# Patient Record
Sex: MALE | Race: WHITE | NOT HISPANIC OR LATINO
[De-identification: names, ages, dates, MRNs, and addresses within clinical notes are randomized per-mention and may not be internally consistent; named-entity substitution may affect disease eponyms.]

---

## 2017-01-19 ENCOUNTER — APPOINTMENT (OUTPATIENT)
Dept: ORTHOPEDIC SURGERY | Facility: CLINIC | Age: 52
End: 2017-01-19

## 2017-01-19 VITALS
DIASTOLIC BLOOD PRESSURE: 102 MMHG | HEART RATE: 73 BPM | BODY MASS INDEX: 37.51 KG/M2 | HEIGHT: 67 IN | WEIGHT: 239 LBS | SYSTOLIC BLOOD PRESSURE: 157 MMHG

## 2017-01-19 DIAGNOSIS — M25.851 OTHER SPECIFIED JOINT DISORDERS, RIGHT HIP: ICD-10-CM

## 2017-01-19 DIAGNOSIS — M19.90 UNSPECIFIED OSTEOARTHRITIS, UNSPECIFIED SITE: ICD-10-CM

## 2017-02-02 ENCOUNTER — MEDICATION RENEWAL (OUTPATIENT)
Age: 52
End: 2017-02-02

## 2017-02-02 ENCOUNTER — APPOINTMENT (OUTPATIENT)
Dept: INTERNAL MEDICINE | Facility: CLINIC | Age: 52
End: 2017-02-02

## 2017-02-02 VITALS
RESPIRATION RATE: 14 BRPM | HEIGHT: 67 IN | SYSTOLIC BLOOD PRESSURE: 126 MMHG | BODY MASS INDEX: 37.67 KG/M2 | HEART RATE: 78 BPM | OXYGEN SATURATION: 96 % | WEIGHT: 240 LBS | DIASTOLIC BLOOD PRESSURE: 80 MMHG

## 2017-02-03 LAB — HBA1C MFR BLD HPLC: 5.6 %

## 2017-02-06 LAB
ALBUMIN SERPL ELPH-MCNC: 4.2 G/DL
ALP BLD-CCNC: 85 U/L
ALT SERPL-CCNC: 40 U/L
ANION GAP SERPL CALC-SCNC: 15 MMOL/L
AST SERPL-CCNC: 27 U/L
BILIRUB SERPL-MCNC: 0.6 MG/DL
BUN SERPL-MCNC: 19 MG/DL
CALCIUM SERPL-MCNC: 9 MG/DL
CHLORIDE SERPL-SCNC: 102 MMOL/L
CHOLEST SERPL-MCNC: 126 MG/DL
CHOLEST/HDLC SERPL: 2.9 RATIO
CO2 SERPL-SCNC: 26 MMOL/L
CREAT SERPL-MCNC: 1.05 MG/DL
GLUCOSE SERPL-MCNC: 105 MG/DL
HDLC SERPL-MCNC: 44 MG/DL
LDLC SERPL CALC-MCNC: 64 MG/DL
POTASSIUM SERPL-SCNC: 4.2 MMOL/L
PROT SERPL-MCNC: 7.5 G/DL
SODIUM SERPL-SCNC: 143 MMOL/L
TRIGL SERPL-MCNC: 90 MG/DL

## 2017-03-02 ENCOUNTER — APPOINTMENT (OUTPATIENT)
Dept: DERMATOLOGY | Facility: CLINIC | Age: 52
End: 2017-03-02

## 2017-03-02 ENCOUNTER — APPOINTMENT (OUTPATIENT)
Dept: INTERNAL MEDICINE | Facility: CLINIC | Age: 52
End: 2017-03-02

## 2017-03-02 VITALS
BODY MASS INDEX: 37.35 KG/M2 | RESPIRATION RATE: 14 BRPM | HEART RATE: 85 BPM | TEMPERATURE: 97.8 F | OXYGEN SATURATION: 98 % | SYSTOLIC BLOOD PRESSURE: 140 MMHG | WEIGHT: 238 LBS | DIASTOLIC BLOOD PRESSURE: 98 MMHG | HEIGHT: 67 IN

## 2017-03-02 VITALS — DIASTOLIC BLOOD PRESSURE: 80 MMHG | SYSTOLIC BLOOD PRESSURE: 130 MMHG

## 2017-03-02 DIAGNOSIS — M54.9 DORSALGIA, UNSPECIFIED: ICD-10-CM

## 2017-03-03 ENCOUNTER — APPOINTMENT (OUTPATIENT)
Dept: DERMATOLOGY | Facility: CLINIC | Age: 52
End: 2017-03-03

## 2017-03-08 ENCOUNTER — APPOINTMENT (OUTPATIENT)
Dept: ORTHOPEDIC SURGERY | Facility: CLINIC | Age: 52
End: 2017-03-08

## 2017-03-21 ENCOUNTER — OUTPATIENT (OUTPATIENT)
Dept: OUTPATIENT SERVICES | Facility: HOSPITAL | Age: 52
LOS: 1 days | End: 2017-03-21
Payer: COMMERCIAL

## 2017-03-21 VITALS
OXYGEN SATURATION: 98 % | SYSTOLIC BLOOD PRESSURE: 153 MMHG | RESPIRATION RATE: 18 BRPM | HEIGHT: 67 IN | TEMPERATURE: 98 F | DIASTOLIC BLOOD PRESSURE: 100 MMHG | WEIGHT: 138.89 LBS

## 2017-03-21 DIAGNOSIS — M19.90 UNSPECIFIED OSTEOARTHRITIS, UNSPECIFIED SITE: ICD-10-CM

## 2017-03-21 DIAGNOSIS — M16.12 UNILATERAL PRIMARY OSTEOARTHRITIS, LEFT HIP: ICD-10-CM

## 2017-03-21 DIAGNOSIS — Z01.818 ENCOUNTER FOR OTHER PREPROCEDURAL EXAMINATION: ICD-10-CM

## 2017-03-21 DIAGNOSIS — Z98.890 OTHER SPECIFIED POSTPROCEDURAL STATES: Chronic | ICD-10-CM

## 2017-03-21 LAB
ALBUMIN SERPL ELPH-MCNC: 3.9 G/DL — SIGNIFICANT CHANGE UP (ref 3.3–5)
ALP SERPL-CCNC: 89 U/L — SIGNIFICANT CHANGE UP (ref 30–120)
ALT FLD-CCNC: 54 U/L DA — SIGNIFICANT CHANGE UP (ref 10–60)
ANION GAP SERPL CALC-SCNC: 8 MMOL/L — SIGNIFICANT CHANGE UP (ref 5–17)
APTT BLD: 27.8 SEC — SIGNIFICANT CHANGE UP (ref 27.5–37.4)
AST SERPL-CCNC: 22 U/L — SIGNIFICANT CHANGE UP (ref 10–40)
BILIRUB SERPL-MCNC: 0.4 MG/DL — SIGNIFICANT CHANGE UP (ref 0.2–1.2)
BLD GP AB SCN SERPL QL: SIGNIFICANT CHANGE UP
BUN SERPL-MCNC: 16 MG/DL — SIGNIFICANT CHANGE UP (ref 7–23)
CALCIUM SERPL-MCNC: 9.6 MG/DL — SIGNIFICANT CHANGE UP (ref 8.4–10.5)
CHLORIDE SERPL-SCNC: 105 MMOL/L — SIGNIFICANT CHANGE UP (ref 96–108)
CO2 SERPL-SCNC: 28 MMOL/L — SIGNIFICANT CHANGE UP (ref 22–31)
CREAT SERPL-MCNC: 1.05 MG/DL — SIGNIFICANT CHANGE UP (ref 0.5–1.3)
GLUCOSE SERPL-MCNC: 132 MG/DL — HIGH (ref 70–99)
HCT VFR BLD CALC: 49.3 % — SIGNIFICANT CHANGE UP (ref 39–50)
HGB BLD-MCNC: 17.2 G/DL — HIGH (ref 13–17)
INR BLD: 0.96 RATIO — SIGNIFICANT CHANGE UP (ref 0.88–1.16)
MCHC RBC-ENTMCNC: 31.3 PG — SIGNIFICANT CHANGE UP (ref 27–34)
MCHC RBC-ENTMCNC: 35 GM/DL — SIGNIFICANT CHANGE UP (ref 32–36)
MCV RBC AUTO: 89.5 FL — SIGNIFICANT CHANGE UP (ref 80–100)
MRSA PCR RESULT.: SIGNIFICANT CHANGE UP
PLATELET # BLD AUTO: 250 K/UL — SIGNIFICANT CHANGE UP (ref 150–400)
POTASSIUM SERPL-MCNC: 4.1 MMOL/L — SIGNIFICANT CHANGE UP (ref 3.5–5.3)
POTASSIUM SERPL-SCNC: 4.1 MMOL/L — SIGNIFICANT CHANGE UP (ref 3.5–5.3)
PROT SERPL-MCNC: 8.1 G/DL — SIGNIFICANT CHANGE UP (ref 6–8.3)
PROTHROM AB SERPL-ACNC: 10.4 SEC — SIGNIFICANT CHANGE UP (ref 9.8–12.7)
RBC # BLD: 5.51 M/UL — SIGNIFICANT CHANGE UP (ref 4.2–5.8)
RBC # FLD: 12.8 % — SIGNIFICANT CHANGE UP (ref 10.3–14.5)
S AUREUS DNA NOSE QL NAA+PROBE: SIGNIFICANT CHANGE UP
SODIUM SERPL-SCNC: 141 MMOL/L — SIGNIFICANT CHANGE UP (ref 135–145)
WBC # BLD: 14.2 K/UL — HIGH (ref 3.8–10.5)
WBC # FLD AUTO: 14.2 K/UL — HIGH (ref 3.8–10.5)

## 2017-03-21 PROCEDURE — 93010 ELECTROCARDIOGRAM REPORT: CPT | Mod: NC

## 2017-03-21 NOTE — H&P PST ADULT - HISTORY OF PRESENT ILLNESS
52 y/o male from home in [facility] with left hip pain Failed pain management. Pain intermittent mostly with sitting, not on any pain meds at present.

## 2017-03-21 NOTE — H&P PST ADULT - RS GEN PE MLT RESP DETAILS PC
normal/no chest wall tenderness/respirations non-labored/breath sounds equal/airway patent/clear to auscultation bilaterally/good air movement

## 2017-03-28 ENCOUNTER — APPOINTMENT (OUTPATIENT)
Dept: INTERNAL MEDICINE | Facility: CLINIC | Age: 52
End: 2017-03-28

## 2017-03-28 VITALS
WEIGHT: 240 LBS | HEART RATE: 70 BPM | SYSTOLIC BLOOD PRESSURE: 124 MMHG | BODY MASS INDEX: 37.67 KG/M2 | RESPIRATION RATE: 14 BRPM | HEIGHT: 67 IN | OXYGEN SATURATION: 98 % | DIASTOLIC BLOOD PRESSURE: 80 MMHG | TEMPERATURE: 97.8 F

## 2017-03-29 ENCOUNTER — APPOINTMENT (OUTPATIENT)
Dept: ORTHOPEDIC SURGERY | Facility: CLINIC | Age: 52
End: 2017-03-29

## 2017-03-29 VITALS
BODY MASS INDEX: 37.51 KG/M2 | HEART RATE: 73 BPM | DIASTOLIC BLOOD PRESSURE: 85 MMHG | WEIGHT: 239 LBS | SYSTOLIC BLOOD PRESSURE: 129 MMHG | HEIGHT: 67 IN

## 2017-03-29 DIAGNOSIS — M54.16 RADICULOPATHY, LUMBAR REGION: ICD-10-CM

## 2017-03-29 DIAGNOSIS — M47.816 SPONDYLOSIS W/OUT MYELOPATHY OR RADICULOPATHY, LUMBAR REGION: ICD-10-CM

## 2017-03-29 DIAGNOSIS — M51.26 OTHER INTERVERTEBRAL DISC DISPLACEMENT, LUMBAR REGION: ICD-10-CM

## 2017-03-29 LAB
HCT VFR BLD CALC: 49.3 % — SIGNIFICANT CHANGE UP (ref 39–50)
HGB BLD-MCNC: 17.5 G/DL — HIGH (ref 13–17)
MCHC RBC-ENTMCNC: 31 PG — SIGNIFICANT CHANGE UP (ref 27–34)
MCHC RBC-ENTMCNC: 35.6 GM/DL — SIGNIFICANT CHANGE UP (ref 32–36)
MCV RBC AUTO: 87 FL — SIGNIFICANT CHANGE UP (ref 80–100)
NRBC # BLD: SIGNIFICANT CHANGE UP /100 WBCS (ref 0–0)
PLATELET # BLD AUTO: 248 K/UL — SIGNIFICANT CHANGE UP (ref 150–400)
RBC # BLD: 5.66 M/UL — SIGNIFICANT CHANGE UP (ref 4.2–5.8)
RBC # FLD: 12.3 % — SIGNIFICANT CHANGE UP (ref 10.3–14.5)
WBC # BLD: 8.9 K/UL — SIGNIFICANT CHANGE UP (ref 3.8–10.5)
WBC # FLD AUTO: 8.9 K/UL — SIGNIFICANT CHANGE UP (ref 3.8–10.5)

## 2017-03-29 NOTE — PROGRESS NOTE PEDS - SUBJECTIVE AND OBJECTIVE BOX
Cbc 14.2. patient was on prednisone the day pPST labs were done. Patient was called and repeated CBC. Will follow up

## 2017-03-30 ENCOUNTER — RESULT REVIEW (OUTPATIENT)
Age: 52
End: 2017-03-30

## 2017-03-30 RX ORDER — FAMOTIDINE 10 MG/ML
1 INJECTION INTRAVENOUS
Qty: 0 | Refills: 0 | COMMUNITY
Start: 2017-03-30 | End: 2017-03-31

## 2017-03-30 RX ORDER — ZINC SULFATE TAB 220 MG (50 MG ZINC EQUIVALENT) 220 (50 ZN) MG
1 TAB ORAL
Qty: 0 | Refills: 0 | DISCHARGE
Start: 2017-03-30

## 2017-03-30 RX ORDER — APREPITANT 80 MG/1
40 CAPSULE ORAL ONCE
Qty: 0 | Refills: 0 | Status: COMPLETED | OUTPATIENT
Start: 2017-03-31 | End: 2017-03-31

## 2017-03-30 RX ORDER — SIMVASTATIN 20 MG/1
1 TABLET, FILM COATED ORAL
Qty: 0 | Refills: 0 | DISCHARGE
Start: 2017-03-30 | End: 2017-03-31

## 2017-03-30 RX ORDER — CELECOXIB 200 MG/1
200 CAPSULE ORAL ONCE
Qty: 0 | Refills: 0 | Status: COMPLETED | OUTPATIENT
Start: 2017-03-31 | End: 2017-03-31

## 2017-03-30 RX ORDER — CHOLECALCIFEROL (VITAMIN D3) 125 MCG
1 CAPSULE ORAL
Qty: 0 | Refills: 0 | DISCHARGE
Start: 2017-03-30

## 2017-03-30 NOTE — PATIENT PROFILE ADULT. - PMH
Essential hypertension    History of pancreatitis  2012  History of stroke  2011, no residual left  JUAN PABLO on CPAP    Primary osteoarthritis of left hip    Seasonal allergies

## 2017-03-31 ENCOUNTER — APPOINTMENT (OUTPATIENT)
Dept: ORTHOPEDIC SURGERY | Facility: HOSPITAL | Age: 52
End: 2017-03-31

## 2017-03-31 ENCOUNTER — INPATIENT (INPATIENT)
Facility: HOSPITAL | Age: 52
LOS: 2 days | Discharge: HOME | DRG: 470 | End: 2017-04-03
Attending: ORTHOPAEDIC SURGERY | Admitting: ORTHOPAEDIC SURGERY
Payer: COMMERCIAL

## 2017-03-31 VITALS
OXYGEN SATURATION: 97 % | RESPIRATION RATE: 16 BRPM | HEART RATE: 66 BPM | WEIGHT: 235.89 LBS | TEMPERATURE: 98 F | DIASTOLIC BLOOD PRESSURE: 79 MMHG | SYSTOLIC BLOOD PRESSURE: 126 MMHG | HEIGHT: 68 IN

## 2017-03-31 DIAGNOSIS — M19.90 UNSPECIFIED OSTEOARTHRITIS, UNSPECIFIED SITE: ICD-10-CM

## 2017-03-31 DIAGNOSIS — M16.12 UNILATERAL PRIMARY OSTEOARTHRITIS, LEFT HIP: ICD-10-CM

## 2017-03-31 DIAGNOSIS — I10 ESSENTIAL (PRIMARY) HYPERTENSION: ICD-10-CM

## 2017-03-31 DIAGNOSIS — Z98.890 OTHER SPECIFIED POSTPROCEDURAL STATES: Chronic | ICD-10-CM

## 2017-03-31 DIAGNOSIS — Z87.19 PERSONAL HISTORY OF OTHER DISEASES OF THE DIGESTIVE SYSTEM: ICD-10-CM

## 2017-03-31 DIAGNOSIS — Z86.73 PERSONAL HISTORY OF TRANSIENT ISCHEMIC ATTACK (TIA), AND CEREBRAL INFARCTION WITHOUT RESIDUAL DEFICITS: ICD-10-CM

## 2017-03-31 DIAGNOSIS — G47.33 OBSTRUCTIVE SLEEP APNEA (ADULT) (PEDIATRIC): ICD-10-CM

## 2017-03-31 LAB
ABO RH CONFIRMATION: SIGNIFICANT CHANGE UP
ANION GAP SERPL CALC-SCNC: 7 MMOL/L — SIGNIFICANT CHANGE UP (ref 5–17)
APPEARANCE UR: ABNORMAL
BACTERIA # UR AUTO: ABNORMAL
BILIRUB UR-MCNC: NEGATIVE — SIGNIFICANT CHANGE UP
BUN SERPL-MCNC: 16 MG/DL — SIGNIFICANT CHANGE UP (ref 7–23)
CALCIUM SERPL-MCNC: 8.2 MG/DL — LOW (ref 8.4–10.5)
CHLORIDE SERPL-SCNC: 105 MMOL/L — SIGNIFICANT CHANGE UP (ref 96–108)
CO2 SERPL-SCNC: 28 MMOL/L — SIGNIFICANT CHANGE UP (ref 22–31)
COLOR SPEC: YELLOW — SIGNIFICANT CHANGE UP
CREAT SERPL-MCNC: 1.08 MG/DL — SIGNIFICANT CHANGE UP (ref 0.5–1.3)
DIFF PNL FLD: NEGATIVE — SIGNIFICANT CHANGE UP
EPI CELLS # UR: SIGNIFICANT CHANGE UP
GLUCOSE SERPL-MCNC: 143 MG/DL — HIGH (ref 70–99)
GLUCOSE UR QL: NEGATIVE MG/DL — SIGNIFICANT CHANGE UP
HCT VFR BLD CALC: 39.6 % — SIGNIFICANT CHANGE UP (ref 39–50)
HGB BLD-MCNC: 13.7 G/DL — SIGNIFICANT CHANGE UP (ref 13–17)
KETONES UR-MCNC: NEGATIVE — SIGNIFICANT CHANGE UP
LEUKOCYTE ESTERASE UR-ACNC: ABNORMAL
MCHC RBC-ENTMCNC: 30.7 PG — SIGNIFICANT CHANGE UP (ref 27–34)
MCHC RBC-ENTMCNC: 34.5 GM/DL — SIGNIFICANT CHANGE UP (ref 32–36)
MCV RBC AUTO: 88.9 FL — SIGNIFICANT CHANGE UP (ref 80–100)
NITRITE UR-MCNC: NEGATIVE — SIGNIFICANT CHANGE UP
PH UR: 6.5 — SIGNIFICANT CHANGE UP (ref 4.8–8)
PLATELET # BLD AUTO: 207 K/UL — SIGNIFICANT CHANGE UP (ref 150–400)
POTASSIUM SERPL-MCNC: 3.4 MMOL/L — LOW (ref 3.5–5.3)
POTASSIUM SERPL-SCNC: 3.4 MMOL/L — LOW (ref 3.5–5.3)
PROT UR-MCNC: 15 MG/DL
RBC # BLD: 4.46 M/UL — SIGNIFICANT CHANGE UP (ref 4.2–5.8)
RBC # FLD: 12.3 % — SIGNIFICANT CHANGE UP (ref 10.3–14.5)
RBC CASTS # UR COMP ASSIST: SIGNIFICANT CHANGE UP /HPF (ref 0–4)
SODIUM SERPL-SCNC: 140 MMOL/L — SIGNIFICANT CHANGE UP (ref 135–145)
SP GR SPEC: 1.01 — SIGNIFICANT CHANGE UP (ref 1.01–1.02)
UROBILINOGEN FLD QL: NEGATIVE MG/DL — SIGNIFICANT CHANGE UP
WBC # BLD: 17.1 K/UL — HIGH (ref 3.8–10.5)
WBC # FLD AUTO: 17.1 K/UL — HIGH (ref 3.8–10.5)
WBC UR QL: SIGNIFICANT CHANGE UP

## 2017-03-31 PROCEDURE — 86901 BLOOD TYPING SEROLOGIC RH(D): CPT

## 2017-03-31 PROCEDURE — 85610 PROTHROMBIN TIME: CPT

## 2017-03-31 PROCEDURE — 27130 TOTAL HIP ARTHROPLASTY: CPT | Mod: LT

## 2017-03-31 PROCEDURE — 87641 MR-STAPH DNA AMP PROBE: CPT

## 2017-03-31 PROCEDURE — 86850 RBC ANTIBODY SCREEN: CPT

## 2017-03-31 PROCEDURE — 93005 ELECTROCARDIOGRAM TRACING: CPT

## 2017-03-31 PROCEDURE — 88305 TISSUE EXAM BY PATHOLOGIST: CPT | Mod: 26

## 2017-03-31 PROCEDURE — 88311 DECALCIFY TISSUE: CPT | Mod: 26

## 2017-03-31 PROCEDURE — 36415 COLL VENOUS BLD VENIPUNCTURE: CPT

## 2017-03-31 PROCEDURE — 86900 BLOOD TYPING SEROLOGIC ABO: CPT

## 2017-03-31 PROCEDURE — 80053 COMPREHEN METABOLIC PANEL: CPT

## 2017-03-31 PROCEDURE — 99223 1ST HOSP IP/OBS HIGH 75: CPT

## 2017-03-31 PROCEDURE — 87640 STAPH A DNA AMP PROBE: CPT

## 2017-03-31 PROCEDURE — 85027 COMPLETE CBC AUTOMATED: CPT

## 2017-03-31 PROCEDURE — 27130 TOTAL HIP ARTHROPLASTY: CPT | Mod: AS,LT

## 2017-03-31 PROCEDURE — 73502 X-RAY EXAM HIP UNI 2-3 VIEWS: CPT | Mod: 26,LT

## 2017-03-31 PROCEDURE — 85730 THROMBOPLASTIN TIME PARTIAL: CPT

## 2017-03-31 PROCEDURE — G0463: CPT

## 2017-03-31 RX ORDER — CELECOXIB 200 MG/1
200 CAPSULE ORAL
Qty: 0 | Refills: 0 | Status: DISCONTINUED | OUTPATIENT
Start: 2017-04-01 | End: 2017-04-03

## 2017-03-31 RX ORDER — ONDANSETRON 8 MG/1
4 TABLET, FILM COATED ORAL EVERY 6 HOURS
Qty: 0 | Refills: 0 | Status: DISCONTINUED | OUTPATIENT
Start: 2017-03-31 | End: 2017-04-03

## 2017-03-31 RX ORDER — CEFAZOLIN SODIUM 1 G
2000 VIAL (EA) INJECTION EVERY 8 HOURS
Qty: 0 | Refills: 0 | Status: COMPLETED | OUTPATIENT
Start: 2017-03-31 | End: 2017-04-01

## 2017-03-31 RX ORDER — AMLODIPINE BESYLATE 2.5 MG/1
10 TABLET ORAL DAILY
Qty: 0 | Refills: 0 | Status: DISCONTINUED | OUTPATIENT
Start: 2017-04-01 | End: 2017-04-03

## 2017-03-31 RX ORDER — CEFAZOLIN SODIUM 1 G
2000 VIAL (EA) INJECTION ONCE
Qty: 0 | Refills: 0 | Status: COMPLETED | OUTPATIENT
Start: 2017-03-31 | End: 2017-03-31

## 2017-03-31 RX ORDER — POLYETHYLENE GLYCOL 3350 17 G/17G
17 POWDER, FOR SOLUTION ORAL DAILY
Qty: 0 | Refills: 0 | Status: DISCONTINUED | OUTPATIENT
Start: 2017-03-31 | End: 2017-04-03

## 2017-03-31 RX ORDER — SENNA PLUS 8.6 MG/1
2 TABLET ORAL AT BEDTIME
Qty: 0 | Refills: 0 | Status: DISCONTINUED | OUTPATIENT
Start: 2017-03-31 | End: 2017-04-03

## 2017-03-31 RX ORDER — SODIUM CHLORIDE 9 MG/ML
1000 INJECTION, SOLUTION INTRAVENOUS
Qty: 0 | Refills: 0 | Status: DISCONTINUED | OUTPATIENT
Start: 2017-03-31 | End: 2017-03-31

## 2017-03-31 RX ORDER — ACETAMINOPHEN 500 MG
1000 TABLET ORAL ONCE
Qty: 0 | Refills: 0 | Status: COMPLETED | OUTPATIENT
Start: 2017-03-31 | End: 2017-03-31

## 2017-03-31 RX ORDER — LOSARTAN POTASSIUM 100 MG/1
100 TABLET, FILM COATED ORAL DAILY
Qty: 0 | Refills: 0 | Status: DISCONTINUED | OUTPATIENT
Start: 2017-04-02 | End: 2017-04-03

## 2017-03-31 RX ORDER — CHOLECALCIFEROL (VITAMIN D3) 125 MCG
1000 CAPSULE ORAL DAILY
Qty: 0 | Refills: 0 | Status: DISCONTINUED | OUTPATIENT
Start: 2017-03-31 | End: 2017-04-03

## 2017-03-31 RX ORDER — SODIUM CHLORIDE 9 MG/ML
1000 INJECTION, SOLUTION INTRAVENOUS
Qty: 0 | Refills: 0 | Status: DISCONTINUED | OUTPATIENT
Start: 2017-03-31 | End: 2017-04-01

## 2017-03-31 RX ORDER — METOPROLOL TARTRATE 50 MG
50 TABLET ORAL DAILY
Qty: 0 | Refills: 0 | Status: DISCONTINUED | OUTPATIENT
Start: 2017-04-01 | End: 2017-04-03

## 2017-03-31 RX ORDER — SIMVASTATIN 20 MG/1
10 TABLET, FILM COATED ORAL AT BEDTIME
Qty: 0 | Refills: 0 | Status: DISCONTINUED | OUTPATIENT
Start: 2017-03-31 | End: 2017-04-03

## 2017-03-31 RX ORDER — ACETAMINOPHEN 500 MG
1000 TABLET ORAL EVERY 6 HOURS
Qty: 0 | Refills: 0 | Status: COMPLETED | OUTPATIENT
Start: 2017-03-31 | End: 2017-04-01

## 2017-03-31 RX ORDER — MAGNESIUM HYDROXIDE 400 MG/1
30 TABLET, CHEWABLE ORAL DAILY
Qty: 0 | Refills: 0 | Status: DISCONTINUED | OUTPATIENT
Start: 2017-03-31 | End: 2017-04-03

## 2017-03-31 RX ORDER — OXYCODONE HYDROCHLORIDE 5 MG/1
10 TABLET ORAL
Qty: 0 | Refills: 0 | Status: DISCONTINUED | OUTPATIENT
Start: 2017-03-31 | End: 2017-04-03

## 2017-03-31 RX ORDER — ACETAMINOPHEN 500 MG
1000 TABLET ORAL EVERY 8 HOURS
Qty: 0 | Refills: 0 | Status: DISCONTINUED | OUTPATIENT
Start: 2017-04-01 | End: 2017-04-03

## 2017-03-31 RX ORDER — OXYCODONE HYDROCHLORIDE 5 MG/1
5 TABLET ORAL EVERY 4 HOURS
Qty: 0 | Refills: 0 | Status: DISCONTINUED | OUTPATIENT
Start: 2017-03-31 | End: 2017-03-31

## 2017-03-31 RX ORDER — HYDROMORPHONE HYDROCHLORIDE 2 MG/ML
0.5 INJECTION INTRAMUSCULAR; INTRAVENOUS; SUBCUTANEOUS
Qty: 0 | Refills: 0 | Status: DISCONTINUED | OUTPATIENT
Start: 2017-03-31 | End: 2017-04-03

## 2017-03-31 RX ORDER — LORATADINE 10 MG/1
10 TABLET ORAL DAILY
Qty: 0 | Refills: 0 | Status: DISCONTINUED | OUTPATIENT
Start: 2017-03-31 | End: 2017-04-03

## 2017-03-31 RX ORDER — FENTANYL CITRATE 50 UG/ML
25 INJECTION INTRAVENOUS
Qty: 0 | Refills: 0 | Status: DISCONTINUED | OUTPATIENT
Start: 2017-03-31 | End: 2017-03-31

## 2017-03-31 RX ORDER — ASPIRIN/CALCIUM CARB/MAGNESIUM 324 MG
325 TABLET ORAL DAILY
Qty: 0 | Refills: 0 | Status: DISCONTINUED | OUTPATIENT
Start: 2017-04-01 | End: 2017-04-03

## 2017-03-31 RX ORDER — POTASSIUM CHLORIDE 20 MEQ
20 PACKET (EA) ORAL ONCE
Qty: 0 | Refills: 0 | Status: COMPLETED | OUTPATIENT
Start: 2017-03-31 | End: 2017-03-31

## 2017-03-31 RX ORDER — OXYCODONE HYDROCHLORIDE 5 MG/1
5 TABLET ORAL
Qty: 0 | Refills: 0 | Status: DISCONTINUED | OUTPATIENT
Start: 2017-03-31 | End: 2017-04-03

## 2017-03-31 RX ORDER — OXYCODONE HYDROCHLORIDE 5 MG/1
10 TABLET ORAL EVERY 6 HOURS
Qty: 0 | Refills: 0 | Status: DISCONTINUED | OUTPATIENT
Start: 2017-03-31 | End: 2017-03-31

## 2017-03-31 RX ORDER — PANTOPRAZOLE SODIUM 20 MG/1
40 TABLET, DELAYED RELEASE ORAL
Qty: 0 | Refills: 0 | Status: DISCONTINUED | OUTPATIENT
Start: 2017-03-31 | End: 2017-04-03

## 2017-03-31 RX ORDER — FLUTICASONE PROPIONATE 50 MCG
1 SPRAY, SUSPENSION NASAL DAILY
Qty: 0 | Refills: 0 | Status: DISCONTINUED | OUTPATIENT
Start: 2017-03-31 | End: 2017-04-03

## 2017-03-31 RX ORDER — DOCUSATE SODIUM 100 MG
100 CAPSULE ORAL THREE TIMES A DAY
Qty: 0 | Refills: 0 | Status: DISCONTINUED | OUTPATIENT
Start: 2017-03-31 | End: 2017-04-03

## 2017-03-31 RX ADMIN — Medication 1000 MILLIGRAM(S): at 16:23

## 2017-03-31 RX ADMIN — FENTANYL CITRATE 25 MICROGRAM(S): 50 INJECTION INTRAVENOUS at 12:30

## 2017-03-31 RX ADMIN — APREPITANT 40 MILLIGRAM(S): 80 CAPSULE ORAL at 07:31

## 2017-03-31 RX ADMIN — FENTANYL CITRATE 25 MICROGRAM(S): 50 INJECTION INTRAVENOUS at 14:03

## 2017-03-31 RX ADMIN — FENTANYL CITRATE 25 MICROGRAM(S): 50 INJECTION INTRAVENOUS at 13:35

## 2017-03-31 RX ADMIN — FENTANYL CITRATE 25 MICROGRAM(S): 50 INJECTION INTRAVENOUS at 12:15

## 2017-03-31 RX ADMIN — CELECOXIB 200 MILLIGRAM(S): 200 CAPSULE ORAL at 07:30

## 2017-03-31 RX ADMIN — FENTANYL CITRATE 25 MICROGRAM(S): 50 INJECTION INTRAVENOUS at 13:25

## 2017-03-31 RX ADMIN — Medication 400 MILLIGRAM(S): at 21:33

## 2017-03-31 RX ADMIN — SODIUM CHLORIDE 75 MILLILITER(S): 9 INJECTION, SOLUTION INTRAVENOUS at 12:06

## 2017-03-31 RX ADMIN — Medication 20 MILLIEQUIVALENT(S): at 19:45

## 2017-03-31 RX ADMIN — Medication 1000 MILLIGRAM(S): at 22:00

## 2017-03-31 RX ADMIN — SODIUM CHLORIDE 75 MILLILITER(S): 9 INJECTION, SOLUTION INTRAVENOUS at 16:00

## 2017-03-31 RX ADMIN — HYDROMORPHONE HYDROCHLORIDE 0.5 MILLIGRAM(S): 2 INJECTION INTRAMUSCULAR; INTRAVENOUS; SUBCUTANEOUS at 17:46

## 2017-03-31 RX ADMIN — Medication 400 MILLIGRAM(S): at 16:00

## 2017-03-31 RX ADMIN — HYDROMORPHONE HYDROCHLORIDE 0.5 MILLIGRAM(S): 2 INJECTION INTRAMUSCULAR; INTRAVENOUS; SUBCUTANEOUS at 17:32

## 2017-03-31 RX ADMIN — ONDANSETRON 4 MILLIGRAM(S): 8 TABLET, FILM COATED ORAL at 20:42

## 2017-03-31 RX ADMIN — HYDROMORPHONE HYDROCHLORIDE 0.5 MILLIGRAM(S): 2 INJECTION INTRAMUSCULAR; INTRAVENOUS; SUBCUTANEOUS at 20:57

## 2017-03-31 RX ADMIN — HYDROMORPHONE HYDROCHLORIDE 0.5 MILLIGRAM(S): 2 INJECTION INTRAMUSCULAR; INTRAVENOUS; SUBCUTANEOUS at 20:37

## 2017-03-31 RX ADMIN — FENTANYL CITRATE 25 MICROGRAM(S): 50 INJECTION INTRAVENOUS at 14:50

## 2017-03-31 RX ADMIN — Medication 100 MILLIGRAM(S): at 16:24

## 2017-03-31 RX ADMIN — FENTANYL CITRATE 25 MICROGRAM(S): 50 INJECTION INTRAVENOUS at 14:15

## 2017-03-31 RX ADMIN — SIMVASTATIN 10 MILLIGRAM(S): 20 TABLET, FILM COATED ORAL at 21:33

## 2017-03-31 NOTE — PHYSICAL THERAPY INITIAL EVALUATION ADULT - GAIT TRAINING, PT EVAL
Ambulates 150 feet with RW WBAT left LE independently in 2-3 days. negotiates 1 flight of stairs with handrail and cane WBAT left LE with supervision in 2-3d ays.

## 2017-03-31 NOTE — PHYSICAL THERAPY INITIAL EVALUATION ADULT - ACTIVE RANGE OF MOTION EXAMINATION, REHAB EVAL
vincent. upper extremity Active ROM was WNL (within normal limits)/deficits as listed below/RLE Active ROM was WNL (within normal limits)/Left hip decreased due to sx

## 2017-03-31 NOTE — CONSULT NOTE ADULT - SUBJECTIVE AND OBJECTIVE BOX
Patient is a 51y old  Male who presents with a chief complaint of pain left hip (30 Mar 2017 17:45)      HPI:    PAST MEDICAL & SURGICAL HISTORY:  Primary osteoarthritis of left hip  Primary osteoarthritis of right hip  Seasonal allergies  JUAN PABLO on CPAP  History of pancreatitis:   History of stroke: , no residual left  Essential hypertension  S/P trigger finger release: right 2010      REVIEW OF SYSTEMS:    CONSTITUTIONAL: No fever, weight loss, or fatigue  EYES: No eye pain, visual disturbances, or discharge  ENMT:  No difficulty hearing, tinnitus, vertigo; No sinus or throat pain  NECK: No pain or stiffness  BREASTS: No pain, masses, or nipple discharge  RESPIRATORY: No cough, wheezing, chills or hemoptysis; No shortness of breath  CARDIOVASCULAR: No chest pain, palpitations, dizziness, or leg swelling  GASTROINTESTINAL: No abdominal or epigastric pain. No nausea, vomiting, or hematemesis; No diarrhea or constipation. No melena or hematochezia.  GENITOURINARY: No dysuria, frequency, hematuria, or incontinence  NEUROLOGICAL: No headaches, memory loss, loss of strength, numbness, or tremors  SKIN: No itching, burning, rashes, or lesions   LYMPH NODES: No enlarged glands  ENDOCRINE: No heat or cold intolerance; No hair loss  MUSCULOSKELETAL: No muscle or back pain  PSYCHIATRIC: No depression, anxiety, mood swings, or difficulty sleeping  HEME/LYMPH: No easy bruising, or bleeding gums  ALLERGY AND IMMUNOLOGIC: No hives or eczema      MEDICATIONS  (STANDING):    MEDICATIONS  (PRN):      Allergies    No Known Allergies    Intolerances        SOCIAL HISTORY:  Alochol: Denied  Smoking: Nonsmoker  Drug Use: Denied  Marital Status:           FAMILY HISTORY:  Family history of lung cancer (Father)      Vital Signs Last 24 Hrs  T(C): 36.6, Max: 36.6 ( @ 07:01)  T(F): 97.8, Max: 97.8 ( @ 07:01)  HR: 66 (66 - 66)  BP: 126/79 (126/79 - 126/79)  BP(mean): --  RR: 16 (16 - 16)  SpO2: 97% (97% - 97%)    PHYSICAL EXAM:    GENERAL: NAD, well-groomed, well-developed  HEAD:  Atraumatic, Normocephalic  EYES: EOMI, PERRLA, conjunctiva and sclera clear  ENMT: No tonsillar erythema, exudates, or enlargement; Moist mucous membranes, Good dentition, No lesions  NECK: Supple, No JVD, Normal thyroid  NERVOUS SYSTEM:  Alert & Oriented X3, Good concentration; Motor Strength 5/5 B/L upper and lower extremities; DTRs 2+ intact and symmetric  CHEST/LUNG: Clear to percussion bilaterally; No rales, rhonchi, wheezing, or rubs  HEART: Regular rate and rhythm; No murmurs, rubs, or gallops  ABDOMEN: Soft, Nontender, Nondistended; Bowel sounds present  EXTREMITIES:  2+ Peripheral Pulses, No clubbing, cyanosis, or edema  LYMPH: No lymphadenopathy notedRECTAL: No masses, prostate normal size and smooth, Guiac negative   BREAST: No palpatble masses, skin no lesions no retractions, no discharages. adenexa no palpable masses noted   GYN: uterus normal size, adenexa no palpable masses, no CMT, no uterine discharge   SKIN: No rashes or lesions  INCISION:     LABS:                        17.5   8.9   )-----------( 248      ( 29 Mar 2017 12:06 )             49.3             Urinalysis Basic - ( 31 Mar 2017 07:19 )    Color: Yellow / Appearance: Slightly Turbid / S.015 / pH: x  Gluc: x / Ketone: Negative  / Bili: Negative / Urobili: Negative mg/dL   Blood: x / Protein: 15 mg/dL / Nitrite: Negative   Leuk Esterase: Trace / RBC: 0-2 /HPF / WBC 0-2   Sq Epi: x / Non Sq Epi: Few / Bacteria: Occasional      CAPILLARY BLOOD GLUCOSE  92 (31 Mar 2017 07:01)      RADIOLOGY & ADDITIONAL STUDIES: Patient is a 51y old  Male who presents with a chief complaint of pain left hip (30 Mar 2017 17:45)      HPI:  pt has hx of JUAN PABLO on CPAP at night, pancreatitis, stroke with no residual, had left hip pain for a while and did not respond to any medical  therapy.   PAST MEDICAL & SURGICAL HISTORY:  Primary osteoarthritis of left hip  Primary osteoarthritis of right hip  Seasonal allergies  JUAN PABLO on CPAP  History of pancreatitis:   History of stroke: , no residual left  Essential hypertension  S/P trigger finger release: right 2010      REVIEW OF SYSTEMS:    CONSTITUTIONAL: No fever, weight loss, or fatigue  EYES: No eye pain, visual disturbances, or discharge  ENMT:  No difficulty hearing, tinnitus, vertigo; No sinus or throat pain  NECK: No pain or stiffness  BREASTS: No pain, masses, or nipple discharge  RESPIRATORY: No cough, wheezing, chills or hemoptysis; No shortness of breath  CARDIOVASCULAR: No chest pain, palpitations, dizziness, or leg swelling  GASTROINTESTINAL: No abdominal or epigastric pain. No nausea, vomiting, or hematemesis; No diarrhea or constipation. No melena or hematochezia.  GENITOURINARY: No dysuria, frequency, hematuria, or incontinence  NEUROLOGICAL: No headaches, memory loss, loss of strength, numbness, or tremors  SKIN: No itching, burning, rashes, or lesions   LYMPH NODES: No enlarged glands  ENDOCRINE: No heat or cold intolerance; No hair loss  MUSCULOSKELETAL: No muscle or back pain  PSYCHIATRIC: No depression, anxiety, mood swings, or difficulty sleeping  HEME/LYMPH: No easy bruising, or bleeding gums  ALLERGY AND IMMUNOLOGIC: No hives or eczema      MEDICATIONS  (STANDING): reviewed     MEDICATIONS  (PRN): reviewed       Allergies    No Known Allergies    Intolerances        SOCIAL HISTORY:  Alochol: Denied  Smoking: Nonsmoker  Drug Use: Denied  Marital Status:           FAMILY HISTORY:  Family history of lung cancer (Father)      Vital Signs Last 24 Hrs  T(C): 36.6, Max: 36.6 ( @ 07:01)  T(F): 97.8, Max: 97.8 ( @ 07:01)  HR: 66 (66 - 66)  BP: 126/79 (126/79 - 126/79)  BP(mean): --  RR: 16 (16 - 16)  SpO2: 97% (97% - 97%)    PHYSICAL EXAM:    GENERAL: NAD, well-groomed, well-developed  HEAD:  Atraumatic, Normocephalic  EYES: EOMI, PERRLA, conjunctiva and sclera clear  ENMT: No tonsillar erythema, exudates, or enlargement; Moist mucous membranes, Good dentition, No lesions  NECK: Supple, No JVD, Normal thyroid  NERVOUS SYSTEM:  Alert & Oriented X3, Good concentration; Motor Strength 5/5 B/L upper and lower extremities; DTRs 2+ intact and symmetric  CHEST/LUNG: Clear to percussion bilaterally; No rales, rhonchi, wheezing, or rubs  HEART: Regular rate and rhythm; No murmurs, rubs, or gallops  ABDOMEN: Soft, Nontender, Nondistended; Bowel sounds present  EXTREMITIES:  2+ Peripheral Pulses, No clubbing, cyanosis, or edema  SKIN: No rashes or lesions  INCISION: no drainage    LABS:                        17.5   8.9   )-----------( 248      ( 29 Mar 2017 12:06 )             49.3             Urinalysis Basic - ( 31 Mar 2017 07:19 )    Color: Yellow / Appearance: Slightly Turbid / S.015 / pH: x  Gluc: x / Ketone: Negative  / Bili: Negative / Urobili: Negative mg/dL   Blood: x / Protein: 15 mg/dL / Nitrite: Negative   Leuk Esterase: Trace / RBC: 0-2 /HPF / WBC 0-2   Sq Epi: x / Non Sq Epi: Few / Bacteria: Occasional      CAPILLARY BLOOD GLUCOSE  92 (31 Mar 2017 07:01)      RADIOLOGY & ADDITIONAL STUDIES:

## 2017-03-31 NOTE — PHYSICAL THERAPY INITIAL EVALUATION ADULT - ADDITIONAL COMMENTS
Lives with spouse private house, 3 steps to enter with handrail, will stay on 1st floor,does not own DME.

## 2017-04-01 ENCOUNTER — TRANSCRIPTION ENCOUNTER (OUTPATIENT)
Age: 52
End: 2017-04-01

## 2017-04-01 PROBLEM — M47.816 DJD (DEGENERATIVE JOINT DISEASE), LUMBAR: Status: ACTIVE | Noted: 2017-04-01

## 2017-04-01 PROBLEM — M51.26 LUMBAR HERNIATED DISC: Status: ACTIVE | Noted: 2017-04-01

## 2017-04-01 PROBLEM — M54.16 CHRONIC LUMBAR RADICULOPATHY: Status: ACTIVE | Noted: 2017-04-01

## 2017-04-01 LAB
ANION GAP SERPL CALC-SCNC: 7 MMOL/L — SIGNIFICANT CHANGE UP (ref 5–17)
BUN SERPL-MCNC: 21 MG/DL — SIGNIFICANT CHANGE UP (ref 7–23)
CALCIUM SERPL-MCNC: 8.9 MG/DL — SIGNIFICANT CHANGE UP (ref 8.4–10.5)
CHLORIDE SERPL-SCNC: 103 MMOL/L — SIGNIFICANT CHANGE UP (ref 96–108)
CO2 SERPL-SCNC: 29 MMOL/L — SIGNIFICANT CHANGE UP (ref 22–31)
CREAT SERPL-MCNC: 0.92 MG/DL — SIGNIFICANT CHANGE UP (ref 0.5–1.3)
GLUCOSE SERPL-MCNC: 129 MG/DL — HIGH (ref 70–99)
HCT VFR BLD CALC: 36.2 % — LOW (ref 39–50)
HGB BLD-MCNC: 12.2 G/DL — LOW (ref 13–17)
MCHC RBC-ENTMCNC: 30 PG — SIGNIFICANT CHANGE UP (ref 27–34)
MCHC RBC-ENTMCNC: 33.8 GM/DL — SIGNIFICANT CHANGE UP (ref 32–36)
MCV RBC AUTO: 88.8 FL — SIGNIFICANT CHANGE UP (ref 80–100)
PLATELET # BLD AUTO: 203 K/UL — SIGNIFICANT CHANGE UP (ref 150–400)
POTASSIUM SERPL-MCNC: 4.3 MMOL/L — SIGNIFICANT CHANGE UP (ref 3.5–5.3)
POTASSIUM SERPL-SCNC: 4.3 MMOL/L — SIGNIFICANT CHANGE UP (ref 3.5–5.3)
RBC # BLD: 4.08 M/UL — LOW (ref 4.2–5.8)
RBC # FLD: 12.7 % — SIGNIFICANT CHANGE UP (ref 10.3–14.5)
SODIUM SERPL-SCNC: 139 MMOL/L — SIGNIFICANT CHANGE UP (ref 135–145)
WBC # BLD: 10.1 K/UL — SIGNIFICANT CHANGE UP (ref 3.8–10.5)
WBC # FLD AUTO: 10.1 K/UL — SIGNIFICANT CHANGE UP (ref 3.8–10.5)

## 2017-04-01 PROCEDURE — 99233 SBSQ HOSP IP/OBS HIGH 50: CPT

## 2017-04-01 RX ADMIN — CELECOXIB 200 MILLIGRAM(S): 200 CAPSULE ORAL at 17:11

## 2017-04-01 RX ADMIN — OXYCODONE HYDROCHLORIDE 10 MILLIGRAM(S): 5 TABLET ORAL at 12:20

## 2017-04-01 RX ADMIN — OXYCODONE HYDROCHLORIDE 10 MILLIGRAM(S): 5 TABLET ORAL at 21:04

## 2017-04-01 RX ADMIN — Medication 50 MILLIGRAM(S): at 05:50

## 2017-04-01 RX ADMIN — Medication 1000 MILLIGRAM(S): at 08:51

## 2017-04-01 RX ADMIN — Medication 400 MILLIGRAM(S): at 02:29

## 2017-04-01 RX ADMIN — HYDROMORPHONE HYDROCHLORIDE 0.5 MILLIGRAM(S): 2 INJECTION INTRAMUSCULAR; INTRAVENOUS; SUBCUTANEOUS at 00:18

## 2017-04-01 RX ADMIN — OXYCODONE HYDROCHLORIDE 10 MILLIGRAM(S): 5 TABLET ORAL at 21:39

## 2017-04-01 RX ADMIN — Medication 100 MILLIGRAM(S): at 00:18

## 2017-04-01 RX ADMIN — SIMVASTATIN 10 MILLIGRAM(S): 20 TABLET, FILM COATED ORAL at 21:04

## 2017-04-01 RX ADMIN — OXYCODONE HYDROCHLORIDE 10 MILLIGRAM(S): 5 TABLET ORAL at 08:51

## 2017-04-01 RX ADMIN — Medication 1000 UNIT(S): at 12:16

## 2017-04-01 RX ADMIN — OXYCODONE HYDROCHLORIDE 10 MILLIGRAM(S): 5 TABLET ORAL at 09:30

## 2017-04-01 RX ADMIN — Medication 1000 MILLIGRAM(S): at 03:00

## 2017-04-01 RX ADMIN — Medication 1000 MILLIGRAM(S): at 17:11

## 2017-04-01 RX ADMIN — Medication 1000 MILLIGRAM(S): at 17:13

## 2017-04-01 RX ADMIN — PANTOPRAZOLE SODIUM 40 MILLIGRAM(S): 20 TABLET, DELAYED RELEASE ORAL at 05:50

## 2017-04-01 RX ADMIN — OXYCODONE HYDROCHLORIDE 10 MILLIGRAM(S): 5 TABLET ORAL at 13:00

## 2017-04-01 RX ADMIN — Medication 1000 MILLIGRAM(S): at 08:52

## 2017-04-01 RX ADMIN — SODIUM CHLORIDE 75 MILLILITER(S): 9 INJECTION, SOLUTION INTRAVENOUS at 00:18

## 2017-04-01 RX ADMIN — OXYCODONE HYDROCHLORIDE 10 MILLIGRAM(S): 5 TABLET ORAL at 17:11

## 2017-04-01 RX ADMIN — OXYCODONE HYDROCHLORIDE 10 MILLIGRAM(S): 5 TABLET ORAL at 03:05

## 2017-04-01 RX ADMIN — LORATADINE 10 MILLIGRAM(S): 10 TABLET ORAL at 12:16

## 2017-04-01 RX ADMIN — OXYCODONE HYDROCHLORIDE 10 MILLIGRAM(S): 5 TABLET ORAL at 02:29

## 2017-04-01 RX ADMIN — OXYCODONE HYDROCHLORIDE 10 MILLIGRAM(S): 5 TABLET ORAL at 05:50

## 2017-04-01 RX ADMIN — HYDROMORPHONE HYDROCHLORIDE 0.5 MILLIGRAM(S): 2 INJECTION INTRAMUSCULAR; INTRAVENOUS; SUBCUTANEOUS at 00:35

## 2017-04-01 RX ADMIN — CELECOXIB 200 MILLIGRAM(S): 200 CAPSULE ORAL at 17:13

## 2017-04-01 RX ADMIN — OXYCODONE HYDROCHLORIDE 10 MILLIGRAM(S): 5 TABLET ORAL at 06:30

## 2017-04-01 RX ADMIN — Medication 325 MILLIGRAM(S): at 12:15

## 2017-04-01 RX ADMIN — OXYCODONE HYDROCHLORIDE 10 MILLIGRAM(S): 5 TABLET ORAL at 17:49

## 2017-04-02 LAB
ANION GAP SERPL CALC-SCNC: 5 MMOL/L — SIGNIFICANT CHANGE UP (ref 5–17)
BUN SERPL-MCNC: 24 MG/DL — HIGH (ref 7–23)
CALCIUM SERPL-MCNC: 8.4 MG/DL — SIGNIFICANT CHANGE UP (ref 8.4–10.5)
CHLORIDE SERPL-SCNC: 104 MMOL/L — SIGNIFICANT CHANGE UP (ref 96–108)
CO2 SERPL-SCNC: 32 MMOL/L — HIGH (ref 22–31)
CREAT SERPL-MCNC: 1.16 MG/DL — SIGNIFICANT CHANGE UP (ref 0.5–1.3)
GLUCOSE SERPL-MCNC: 102 MG/DL — HIGH (ref 70–99)
HCT VFR BLD CALC: 34.8 % — LOW (ref 39–50)
HGB BLD-MCNC: 12.1 G/DL — LOW (ref 13–17)
MCHC RBC-ENTMCNC: 31.6 PG — SIGNIFICANT CHANGE UP (ref 27–34)
MCHC RBC-ENTMCNC: 34.9 GM/DL — SIGNIFICANT CHANGE UP (ref 32–36)
MCV RBC AUTO: 90.7 FL — SIGNIFICANT CHANGE UP (ref 80–100)
PLATELET # BLD AUTO: 204 K/UL — SIGNIFICANT CHANGE UP (ref 150–400)
POTASSIUM SERPL-MCNC: 3.5 MMOL/L — SIGNIFICANT CHANGE UP (ref 3.5–5.3)
POTASSIUM SERPL-SCNC: 3.5 MMOL/L — SIGNIFICANT CHANGE UP (ref 3.5–5.3)
RBC # BLD: 3.83 M/UL — LOW (ref 4.2–5.8)
RBC # FLD: 12.7 % — SIGNIFICANT CHANGE UP (ref 10.3–14.5)
SODIUM SERPL-SCNC: 141 MMOL/L — SIGNIFICANT CHANGE UP (ref 135–145)
WBC # BLD: 9.8 K/UL — SIGNIFICANT CHANGE UP (ref 3.8–10.5)
WBC # FLD AUTO: 9.8 K/UL — SIGNIFICANT CHANGE UP (ref 3.8–10.5)

## 2017-04-02 PROCEDURE — 99233 SBSQ HOSP IP/OBS HIGH 50: CPT

## 2017-04-02 RX ADMIN — Medication 50 MILLIGRAM(S): at 06:14

## 2017-04-02 RX ADMIN — Medication 1000 MILLIGRAM(S): at 08:23

## 2017-04-02 RX ADMIN — PANTOPRAZOLE SODIUM 40 MILLIGRAM(S): 20 TABLET, DELAYED RELEASE ORAL at 06:14

## 2017-04-02 RX ADMIN — Medication 1000 MILLIGRAM(S): at 18:06

## 2017-04-02 RX ADMIN — CELECOXIB 200 MILLIGRAM(S): 200 CAPSULE ORAL at 18:06

## 2017-04-02 RX ADMIN — OXYCODONE HYDROCHLORIDE 10 MILLIGRAM(S): 5 TABLET ORAL at 00:50

## 2017-04-02 RX ADMIN — Medication 1000 MILLIGRAM(S): at 18:07

## 2017-04-02 RX ADMIN — LORATADINE 10 MILLIGRAM(S): 10 TABLET ORAL at 06:14

## 2017-04-02 RX ADMIN — OXYCODONE HYDROCHLORIDE 5 MILLIGRAM(S): 5 TABLET ORAL at 23:30

## 2017-04-02 RX ADMIN — Medication 1000 MILLIGRAM(S): at 00:50

## 2017-04-02 RX ADMIN — CELECOXIB 200 MILLIGRAM(S): 200 CAPSULE ORAL at 18:07

## 2017-04-02 RX ADMIN — Medication 1000 UNIT(S): at 12:49

## 2017-04-02 RX ADMIN — OXYCODONE HYDROCHLORIDE 10 MILLIGRAM(S): 5 TABLET ORAL at 00:03

## 2017-04-02 RX ADMIN — Medication 1000 MILLIGRAM(S): at 00:18

## 2017-04-02 RX ADMIN — OXYCODONE HYDROCHLORIDE 10 MILLIGRAM(S): 5 TABLET ORAL at 14:45

## 2017-04-02 RX ADMIN — OXYCODONE HYDROCHLORIDE 5 MILLIGRAM(S): 5 TABLET ORAL at 22:52

## 2017-04-02 RX ADMIN — Medication 325 MILLIGRAM(S): at 12:49

## 2017-04-02 RX ADMIN — OXYCODONE HYDROCHLORIDE 10 MILLIGRAM(S): 5 TABLET ORAL at 14:04

## 2017-04-02 RX ADMIN — OXYCODONE HYDROCHLORIDE 10 MILLIGRAM(S): 5 TABLET ORAL at 03:35

## 2017-04-02 RX ADMIN — CELECOXIB 200 MILLIGRAM(S): 200 CAPSULE ORAL at 08:23

## 2017-04-02 RX ADMIN — OXYCODONE HYDROCHLORIDE 10 MILLIGRAM(S): 5 TABLET ORAL at 03:01

## 2017-04-02 RX ADMIN — OXYCODONE HYDROCHLORIDE 10 MILLIGRAM(S): 5 TABLET ORAL at 06:44

## 2017-04-02 RX ADMIN — OXYCODONE HYDROCHLORIDE 10 MILLIGRAM(S): 5 TABLET ORAL at 06:14

## 2017-04-02 RX ADMIN — SIMVASTATIN 10 MILLIGRAM(S): 20 TABLET, FILM COATED ORAL at 21:00

## 2017-04-03 ENCOUNTER — TRANSCRIPTION ENCOUNTER (OUTPATIENT)
Age: 52
End: 2017-04-03

## 2017-04-03 VITALS
SYSTOLIC BLOOD PRESSURE: 126 MMHG | DIASTOLIC BLOOD PRESSURE: 82 MMHG | HEART RATE: 76 BPM | TEMPERATURE: 98 F | OXYGEN SATURATION: 97 % | RESPIRATION RATE: 16 BRPM

## 2017-04-03 DIAGNOSIS — D50.0 IRON DEFICIENCY ANEMIA SECONDARY TO BLOOD LOSS (CHRONIC): ICD-10-CM

## 2017-04-03 LAB
ANION GAP SERPL CALC-SCNC: 6 MMOL/L — SIGNIFICANT CHANGE UP (ref 5–17)
BUN SERPL-MCNC: 21 MG/DL — SIGNIFICANT CHANGE UP (ref 7–23)
CALCIUM SERPL-MCNC: 8.4 MG/DL — SIGNIFICANT CHANGE UP (ref 8.4–10.5)
CHLORIDE SERPL-SCNC: 106 MMOL/L — SIGNIFICANT CHANGE UP (ref 96–108)
CO2 SERPL-SCNC: 31 MMOL/L — SIGNIFICANT CHANGE UP (ref 22–31)
CREAT SERPL-MCNC: 1.21 MG/DL — SIGNIFICANT CHANGE UP (ref 0.5–1.3)
GLUCOSE SERPL-MCNC: 98 MG/DL — SIGNIFICANT CHANGE UP (ref 70–99)
HCT VFR BLD CALC: 34.5 % — LOW (ref 39–50)
HGB BLD-MCNC: 11.2 G/DL — LOW (ref 13–17)
MCHC RBC-ENTMCNC: 29.1 PG — SIGNIFICANT CHANGE UP (ref 27–34)
MCHC RBC-ENTMCNC: 32.4 GM/DL — SIGNIFICANT CHANGE UP (ref 32–36)
MCV RBC AUTO: 89.6 FL — SIGNIFICANT CHANGE UP (ref 80–100)
PLATELET # BLD AUTO: 224 K/UL — SIGNIFICANT CHANGE UP (ref 150–400)
POTASSIUM SERPL-MCNC: 3.9 MMOL/L — SIGNIFICANT CHANGE UP (ref 3.5–5.3)
POTASSIUM SERPL-SCNC: 3.9 MMOL/L — SIGNIFICANT CHANGE UP (ref 3.5–5.3)
RBC # BLD: 3.85 M/UL — LOW (ref 4.2–5.8)
RBC # FLD: 12.7 % — SIGNIFICANT CHANGE UP (ref 10.3–14.5)
SODIUM SERPL-SCNC: 143 MMOL/L — SIGNIFICANT CHANGE UP (ref 135–145)
WBC # BLD: 9.2 K/UL — SIGNIFICANT CHANGE UP (ref 3.8–10.5)
WBC # FLD AUTO: 9.2 K/UL — SIGNIFICANT CHANGE UP (ref 3.8–10.5)

## 2017-04-03 PROCEDURE — 97110 THERAPEUTIC EXERCISES: CPT

## 2017-04-03 PROCEDURE — 94664 DEMO&/EVAL PT USE INHALER: CPT

## 2017-04-03 PROCEDURE — 36415 COLL VENOUS BLD VENIPUNCTURE: CPT

## 2017-04-03 PROCEDURE — 73502 X-RAY EXAM HIP UNI 2-3 VIEWS: CPT

## 2017-04-03 PROCEDURE — 97165 OT EVAL LOW COMPLEX 30 MIN: CPT

## 2017-04-03 PROCEDURE — C1776: CPT

## 2017-04-03 PROCEDURE — 97535 SELF CARE MNGMENT TRAINING: CPT

## 2017-04-03 PROCEDURE — 81001 URINALYSIS AUTO W/SCOPE: CPT

## 2017-04-03 PROCEDURE — 94660 CPAP INITIATION&MGMT: CPT

## 2017-04-03 PROCEDURE — 97530 THERAPEUTIC ACTIVITIES: CPT

## 2017-04-03 PROCEDURE — 80048 BASIC METABOLIC PNL TOTAL CA: CPT

## 2017-04-03 PROCEDURE — 88311 DECALCIFY TISSUE: CPT

## 2017-04-03 PROCEDURE — 97116 GAIT TRAINING THERAPY: CPT

## 2017-04-03 PROCEDURE — 97161 PT EVAL LOW COMPLEX 20 MIN: CPT

## 2017-04-03 PROCEDURE — 99233 SBSQ HOSP IP/OBS HIGH 50: CPT

## 2017-04-03 PROCEDURE — 88305 TISSUE EXAM BY PATHOLOGIST: CPT

## 2017-04-03 PROCEDURE — 85027 COMPLETE CBC AUTOMATED: CPT

## 2017-04-03 PROCEDURE — C1713: CPT

## 2017-04-03 RX ORDER — OXYCODONE HYDROCHLORIDE 5 MG/1
1 TABLET ORAL
Qty: 40 | Refills: 0
Start: 2017-04-03

## 2017-04-03 RX ORDER — OXYCODONE HYDROCHLORIDE 5 MG/1
1 TABLET ORAL
Qty: 40 | Refills: 0 | OUTPATIENT
Start: 2017-04-03

## 2017-04-03 RX ORDER — ACETAMINOPHEN 500 MG
2 TABLET ORAL
Qty: 0 | Refills: 0 | DISCHARGE
Start: 2017-04-03

## 2017-04-03 RX ORDER — PREDNISOLONE 5 MG
1 TABLET ORAL
Qty: 0 | Refills: 0 | COMMUNITY

## 2017-04-03 RX ORDER — ASPIRIN/CALCIUM CARB/MAGNESIUM 324 MG
1 TABLET ORAL
Qty: 0 | Refills: 0 | COMMUNITY

## 2017-04-03 RX ORDER — ASPIRIN/CALCIUM CARB/MAGNESIUM 324 MG
1 TABLET ORAL
Qty: 65 | Refills: 0
Start: 2017-04-03 | End: 2017-04-21

## 2017-04-03 RX ORDER — CELECOXIB 200 MG/1
1 CAPSULE ORAL
Qty: 36 | Refills: 0
Start: 2017-04-03 | End: 2017-04-21

## 2017-04-03 RX ORDER — PANTOPRAZOLE SODIUM 20 MG/1
1 TABLET, DELAYED RELEASE ORAL
Qty: 40 | Refills: 0
Start: 2017-04-03 | End: 2017-05-13

## 2017-04-03 RX ADMIN — OXYCODONE HYDROCHLORIDE 10 MILLIGRAM(S): 5 TABLET ORAL at 10:02

## 2017-04-03 RX ADMIN — CELECOXIB 200 MILLIGRAM(S): 200 CAPSULE ORAL at 08:42

## 2017-04-03 RX ADMIN — Medication 1000 UNIT(S): at 11:35

## 2017-04-03 RX ADMIN — Medication 1000 MILLIGRAM(S): at 16:42

## 2017-04-03 RX ADMIN — Medication 1000 MILLIGRAM(S): at 16:47

## 2017-04-03 RX ADMIN — Medication 50 MILLIGRAM(S): at 06:20

## 2017-04-03 RX ADMIN — CELECOXIB 200 MILLIGRAM(S): 200 CAPSULE ORAL at 08:41

## 2017-04-03 RX ADMIN — Medication 1000 MILLIGRAM(S): at 08:40

## 2017-04-03 RX ADMIN — Medication 325 MILLIGRAM(S): at 11:35

## 2017-04-03 RX ADMIN — CELECOXIB 200 MILLIGRAM(S): 200 CAPSULE ORAL at 16:41

## 2017-04-03 RX ADMIN — LORATADINE 10 MILLIGRAM(S): 10 TABLET ORAL at 11:34

## 2017-04-03 RX ADMIN — Medication 1000 MILLIGRAM(S): at 01:09

## 2017-04-03 RX ADMIN — OXYCODONE HYDROCHLORIDE 10 MILLIGRAM(S): 5 TABLET ORAL at 16:42

## 2017-04-03 RX ADMIN — OXYCODONE HYDROCHLORIDE 10 MILLIGRAM(S): 5 TABLET ORAL at 10:32

## 2017-04-03 RX ADMIN — Medication 1000 MILLIGRAM(S): at 08:42

## 2017-04-03 RX ADMIN — CELECOXIB 200 MILLIGRAM(S): 200 CAPSULE ORAL at 16:47

## 2017-04-03 RX ADMIN — Medication 1000 MILLIGRAM(S): at 01:36

## 2017-04-03 RX ADMIN — PANTOPRAZOLE SODIUM 40 MILLIGRAM(S): 20 TABLET, DELAYED RELEASE ORAL at 06:20

## 2017-04-03 NOTE — DISCHARGE NOTE ADULT - CARE PROVIDER_API CALL
Francisco Gonzalez), Orthopaedic Surgery; Sports Medicine  [address] [place] [address]  Phone: [phone]  Fax: [phone]

## 2017-04-03 NOTE — PROGRESS NOTE ADULT - PROBLEM SELECTOR PROBLEM 1
Primary osteoarthritis of left hip

## 2017-04-03 NOTE — DISCHARGE NOTE ADULT - DURABLE MEDICAL EQUIPMENT AGENCY
[facility] [phone]  for RW and 3:1 Commode.  [facility] for Hip Kit [phone] [facility] [phone]  for RW and 3:1 Commode.  [facility] for Hip Kit [phone].  [facility] for Hip High chair rental [phone]

## 2017-04-03 NOTE — PROGRESS NOTE ADULT - SUBJECTIVE AND OBJECTIVE BOX
Ortho PA - Post Op Check - S/P - *THR         Pt alert and comfortable with no complaints, pain controlled  Denies nausea     Vital Signs Last 24 Hrs  T(C): 36.8, Max: 36.8 (03-31 @ 11:33)  T(F): 98.3, Max: 98.3 (03-31 @ 11:33)  HR: 77 (71 - 88)  BP: 122/77 (115/73 - 135/84)  BP(mean): --  RR: 14 (13 - 18)  SpO2: 98% (95% - 100%)  Wt(kg): --  I&O's Detail    I & Os for current day (as of 31 Mar 2017 15:04)  =============================================  IN:    lactated ringers.: 1800 ml    Total IN: 1800 ml  ---------------------------------------------  OUT:    Estimated Blood Loss: 200 ml    Total OUT: 200 ml  ---------------------------------------------  Total NET: 1600 ml    I&O's Summary    I & Os for current day (as of 31 Mar 2017 15:04)  =============================================  IN: 1800 ml / OUT: 200 ml / NET: 1600 ml                     PE:  *Hip-abduction pillow in place.  Primary surgical bandage dry and intact.  Hemovac drain intact and patent.  Feet mobile and sensate.  EHLs/ant.tibs. 5/5  PAS on LE's.  Calves soft and nontender.    A/P: Ortho stable  - Continue post-op orders; pain management with  - Check labs today and in A.M.  - DVT prevention with   - PT /OT for OOB, full WBAT  - Medical consult  -Discharge planning  -Will continue to monitor closely with attendings.
Orthopedic P.A.- POD# 3 - s/p Left THR    Patient alert and comfortable in bed with oral narcotics prn for pain control.  Denies hip pain or nausea.    Vital Signs Last 24 Hrs  T(C): 36.6, Max: 36.9 (04-02 @ 15:06)  T(F): 97.8, Max: 98.4 (04-02 @ 15:06)  HR: 72 (61 - 90)  BP: 121/79 (95/62 - 121/79)  BP(mean): --  RR: 16 (14 - 16)  SpO2: 96% (93% - 99%)           Labs:                          11.2<L>  9.2   )-----------( 224      ( 03 Apr 2017 06:29 )             34.5<L>  03 Apr 2017 06:29                 03 Apr 2017 06:29    143    |  106    |  21     ----------------------------<  98     3.9     |  31     |  1.21     Ca    8.4        03 Apr 2017 06:29                    MEDICATIONS:aspirin enteric coated 325milliGRAM(s) Oral daily  celecoxib 200milliGRAM(s) Oral two times a day with meals  HYDROmorphone  Injectable 0.5milliGRAM(s) IV Push every 3 hours PRN  oxyCODONE IR 5milliGRAM(s) Oral every 3 hours PRN  oxyCODONE IR 10milliGRAM(s) Oral every 3 hours PRN  cholecalciferol 1000Unit(s) Oral daily  fluticasone propionate 50 MICROgram(s)/spray Nasal Spray 1Spray(s) Alternating Nostrils daily  amLODIPine   Tablet 10milliGRAM(s) Oral daily  metoprolol succinate ER 50milliGRAM(s) Oral daily  simvastatin 10milliGRAM(s) Oral at bedtime  loratadine 10milliGRAM(s) Oral daily  losartan 100milliGRAM(s) Oral daily  aluminum hydroxide/magnesium hydroxide/simethicone Suspension 30milliLiter(s) Oral four times a day PRN  ondansetron Injectable 4milliGRAM(s) IV Push every 6 hours PRN  pantoprazole    Tablet 40milliGRAM(s) Oral before breakfast  polyethylene glycol 3350 17Gram(s) Oral daily PRN  bisacodyl Suppository 10milliGRAM(s) Rectal daily PRN  senna 2Tablet(s) Oral at bedtime  magnesium hydroxide Suspension 30milliLiter(s) Oral daily PRN  docusate sodium 100milliGRAM(s) Oral three times a day  acetaminophen   Tablet. 1000milliGRAM(s) Oral every 8 hours    Anticoagulation:  aspirin enteric coated 325milliGRAM(s) Oral daily      Pain medications:   celecoxib 200milliGRAM(s) Oral two times a day with meals for H.O. ppx  HYDROmorphone  Injectable 0.5milliGRAM(s) IV Push every 3 hours PRN  oxyCODONE IR 5milliGRAM(s) Oral every 3 hours PRN  oxyCODONE IR 10milliGRAM(s) Oral every 3 hours PRN  ondansetron Injectable 4milliGRAM(s) IV Push every 6 hours PRN  acetaminophen   Tablet. 1000milliGRAM(s) Oral every 8 hours                                   Physical Exam:  Left hip- Abduction pillow in place.  Primary surgical bandage removed and dry and intact Prineo tape noted over surgical wound.   Neurovascular grossly intact LE's.  EHL/ant.tibs 5/5.PAS on LE's.  Calves soft and non-tender.                                                                                                                                                        A/P:  Orthopedically stable.  -Continue pain management with narcotics prn and acetaminophen scheduled  -DVT prophylaxis with once daily Ecotrin 325mg while on Celebrex for HO prevention  -PT/OT to increase ambulation and review posterior dislocation precautions   -Dr. Saldivar for medical care  -Discharge planning for home with home care services probable for today.  -Further plan as per attendings.
Post Op Day # 1    SUBJECTIVE    51y y.o. Male status post left THR .   Patient is alert and comfortable.    Pain is controlled with current pain regimen.  Denies nausea, vomiting, chest pain, shortness of breath, abdominal pain or fever.   No new complaints.    OBJECTIVE    Vital Signs Last 24 Hrs  T(C): 36.5, Max: 36.8 (03-31 @ 11:33)  T(F): 97.7, Max: 98.3 (03-31 @ 11:33)  HR: 66 (63 - 88)  BP: 110/68 (97/61 - 135/84)  BP(mean): --  RR: 16 (13 - 18)  SpO2: 97% (94% - 100%)  I&O's Summary    I & Os for current day (as of 01 Apr 2017 09:17)  =============================================  IN: 2875 ml / OUT: 800 ml / NET: 2075 ml      Left hip dressing is clean, dry and intact.   No erythema/ No exudate/ No blistering/ No ecchymosis.   The calf is supple/nontender.   Passive range of motion is acceptable to due postoperative pain.   Sensation to light touch is grossly intact distally.   The lateral cutaneous nerve is intact.   Motor function distally is intact.   No foot drop.   (2+) dorsalis pedis pulse. Capillary refill is less than 2 seconds. No cyanosis.                          12.2   10.1  )-----------( 203      ( 01 Apr 2017 06:58 )             36.2     01 Apr 2017 06:58    139    |  103    |  21     ----------------------------<  129    4.3     |  29     |  0.92     Ca    8.9        01 Apr 2017 06:58        ASSESSMENT AND PLAN    - Orthopedically stable  - DVT prophylaxis: PAS + Ecotrin  - HO Prophylaxis: Celebrex 200mg PO twice daily x21 days  - Continue physical therapy and occupational therapy  - Weight bearing as tolerated of the left lower extremity with assistance of a walker  - Incentive spirometry encouraged  - Pain control as clinically indicated  - Disposition: Home when medically stable and cleared by PT/OT
Post Op Day # 2    SUBJECTIVE    51y y.o. Male status post left THR .   Patient is alert and comfortable.    Pain is controlled with current pain regimen.  Denies nausea, vomiting, chest pain, shortness of breath, abdominal pain or fever.   No new complaints.    OBJECTIVE    Vital Signs Last 24 Hrs  T(C): 36.8, Max: 36.8 (04-01 @ 19:17)  T(F): 98.3, Max: 98.3 (04-02 @ 07:30)  HR: 64 (64 - 77)  BP: 112/70 (101/67 - 115/67)  BP(mean): --  RR: 16 (14 - 16)  SpO2: 93% (93% - 97%)    Left hip dressing is clean, dry and intact.   No erythema/ No exudate/ No blistering/ No ecchymosis.   The calf is supple/nontender.   Passive range of motion is acceptable to due postoperative pain.   Sensation to light touch is grossly intact distally.   The lateral cutaneous nerve is intact.   Motor function distally is intact.   No foot drop.   (2+) dorsalis pedis pulse. Capillary refill is less than 2 seconds. No cyanosis.                                     12.1   9.8   )-----------( 204      ( 02 Apr 2017 06:49 )             34.8     02 Apr 2017 06:49    141    |  104    |  24     ----------------------------<  102    3.5     |  32     |  1.16     Ca    8.4        02 Apr 2017 06:49          ASSESSMENT AND PLAN    - Orthopedically stable  - DVT prophylaxis: PAS + Ecotrin  - HO Prophylaxis: Celebrex 200mg PO twice daily x21 days  - Continue physical therapy and occupational therapy  - Weight bearing as tolerated of the left lower extremity with assistance of a walker  - Incentive spirometry encouraged  - Pain control as clinically indicated  - Disposition: Home when medically stable and cleared by PT/OT
Patient is a 51y old  Male who presents with a chief complaint of pain left hip (30 Mar 2017 17:45)      INTERVAL HPI/OVERNIGHT EVENTS:  feels better.    Pain Location & Control: controlled.    MEDICATIONS  (STANDING):  aspirin enteric coated 325milliGRAM(s) Oral daily  celecoxib 200milliGRAM(s) Oral two times a day with meals  cholecalciferol 1000Unit(s) Oral daily  fluticasone propionate 50 MICROgram(s)/spray Nasal Spray 1Spray(s) Alternating Nostrils daily  amLODIPine   Tablet 10milliGRAM(s) Oral daily  metoprolol succinate ER 50milliGRAM(s) Oral daily  simvastatin 10milliGRAM(s) Oral at bedtime  loratadine 10milliGRAM(s) Oral daily  lactated ringers. 1000milliLiter(s) IV Continuous <Continuous>  pantoprazole    Tablet 40milliGRAM(s) Oral before breakfast  senna 2Tablet(s) Oral at bedtime  docusate sodium 100milliGRAM(s) Oral three times a day  acetaminophen   Tablet. 1000milliGRAM(s) Oral every 8 hours    MEDICATIONS  (PRN):  HYDROmorphone  Injectable 0.5milliGRAM(s) IV Push every 3 hours PRN Severe Pain (7 - 10)  oxyCODONE IR 5milliGRAM(s) Oral every 3 hours PRN Mild Pain (1 - 3)  oxyCODONE IR 10milliGRAM(s) Oral every 3 hours PRN Moderate Pain (4 - 6)  aluminum hydroxide/magnesium hydroxide/simethicone Suspension 30milliLiter(s) Oral four times a day PRN Indigestion  ondansetron Injectable 4milliGRAM(s) IV Push every 6 hours PRN Nausea and/or Vomiting  polyethylene glycol 3350 17Gram(s) Oral daily PRN Constipation  magnesium hydroxide Suspension 30milliLiter(s) Oral daily PRN Constipation      Allergies    No Known Allergies    Intolerances        REVIEW OF SYSTEMS:  CONSTITUTIONAL: No fever, weight loss, or fatigue  EYES: No eye pain, visual disturbances, or discharge  ENMT:  No difficulty hearing, tinnitus, vertigo; No sinus or throat pain  NECK: No pain or stiffness  BREASTS: No pain, masses, or nipple discharge  RESPIRATORY: No cough, wheezing, chills or hemoptysis; No shortness of breath  CARDIOVASCULAR: No chest pain, palpitations, dizziness, or leg swelling  GASTROINTESTINAL: No abdominal or epigastric pain. No nausea, vomiting, or hematemesis; No diarrhea or constipation. No melena or hematochezia.  GENITOURINARY: No dysuria, frequency, hematuria, or incontinence  NEUROLOGICAL: No headaches, memory loss, loss of strength, numbness, or tremors  SKIN: No itching, burning, rashes, or lesions   LYMPH NODES: No enlarged glands  ENDOCRINE: No heat or cold intolerance; No hair loss; No polydipsia or polyuria  MUSCULOSKELETAL: No back pain  PSYCHIATRIC: No depression, anxiety, mood swings, or difficulty sleeping  HEME/LYMPH: No easy bruising, or bleeding gums  ALLERGY AND IMMUNOLOGIC: No hives or eczema    Vital Signs Last 24 Hrs  T(C): 36.5, Max: 36.8 ( @ 11:33)  T(F): 97.7, Max: 98.3 ( @ 11:33)  HR: 66 (63 - 88)  BP: 110/68 (97/61 - 135/84)  BP(mean): --  RR: 16 (13 - 18)  SpO2: 97% (94% - 100%)    PHYSICAL EXAM:  GENERAL: NAD, well-groomed, well-developed  HEAD:  Atraumatic, Normocephalic  EYES: EOMI, PERRLA, conjunctiva and sclera clear  ENMT: No tonsillar erythema, exudates, or enlargement; Moist mucous membranes, Good dentition, No lesions  NECK: Supple, No JVD, Normal thyroid  NERVOUS SYSTEM:  Alert & Oriented X3, Good concentration; Motor Strength 5/5 B/L upper and lower extremities; DTRs 2+ intact and symmetric  CHEST/LUNG: Clear to auscultation bilaterally; No rales, rhonchi, wheezing, or rubs  HEART: Regular rate and rhythm; No murmurs, rubs, or gallops  ABDOMEN: Soft, Nontender, Nondistended; Bowel sounds present  EXTREMITIES:  2+ Peripheral Pulses, No clubbing or cyanosis  LYMPH: No lymphadenopathy noted  SKIN: No rashes or lesions  INCISION:  Dressing dry and intact    LABS:                        12.2   10.1  )-----------( 203      ( 2017 06:58 )             36.2     2017 06:58    139    |  103    |  21     ----------------------------<  129    4.3     |  29     |  0.92     Ca    8.9        2017 06:58        Urinalysis Basic - ( 31 Mar 2017 07:19 )    Color: Yellow / Appearance: Slightly Turbid / S.015 / pH: x  Gluc: x / Ketone: Negative  / Bili: Negative / Urobili: Negative mg/dL   Blood: x / Protein: 15 mg/dL / Nitrite: Negative   Leuk Esterase: Trace / RBC: 0-2 /HPF / WBC 0-2   Sq Epi: x / Non Sq Epi: Few / Bacteria: Occasional      Consultant(s) Notes Reviewed:  [ x] YES  [ ] NO    Care Discussed with Consultants/Other Providers [x ] YES  [ ] NO
Patient is a 51y old  Male who presents with a chief complaint of pain left hip (30 Mar 2017 17:45)      INTERVAL HPI/OVERNIGHT EVENTS:    Pain Location & Control:     MEDICATIONS  (STANDING):  aspirin enteric coated 325milliGRAM(s) Oral daily  celecoxib 200milliGRAM(s) Oral two times a day with meals  cholecalciferol 1000Unit(s) Oral daily  fluticasone propionate 50 MICROgram(s)/spray Nasal Spray 1Spray(s) Alternating Nostrils daily  amLODIPine   Tablet 10milliGRAM(s) Oral daily  metoprolol succinate ER 50milliGRAM(s) Oral daily  simvastatin 10milliGRAM(s) Oral at bedtime  loratadine 10milliGRAM(s) Oral daily  losartan 100milliGRAM(s) Oral daily  pantoprazole    Tablet 40milliGRAM(s) Oral before breakfast  senna 2Tablet(s) Oral at bedtime  docusate sodium 100milliGRAM(s) Oral three times a day  acetaminophen   Tablet. 1000milliGRAM(s) Oral every 8 hours    MEDICATIONS  (PRN):  HYDROmorphone  Injectable 0.5milliGRAM(s) IV Push every 3 hours PRN Severe Pain (7 - 10)  oxyCODONE IR 5milliGRAM(s) Oral every 3 hours PRN Mild Pain (1 - 3)  oxyCODONE IR 10milliGRAM(s) Oral every 3 hours PRN Moderate Pain (4 - 6)  aluminum hydroxide/magnesium hydroxide/simethicone Suspension 30milliLiter(s) Oral four times a day PRN Indigestion  ondansetron Injectable 4milliGRAM(s) IV Push every 6 hours PRN Nausea and/or Vomiting  polyethylene glycol 3350 17Gram(s) Oral daily PRN Constipation  bisacodyl Suppository 10milliGRAM(s) Rectal daily PRN If no bowel movement by POD#2  magnesium hydroxide Suspension 30milliLiter(s) Oral daily PRN Constipation      Allergies    No Known Allergies    Intolerances        REVIEW OF SYSTEMS:  CONSTITUTIONAL: No fever, weight loss, or fatigue  EYES: No eye pain, visual disturbances, or discharge  ENMT:  No difficulty hearing, tinnitus, vertigo; No sinus or throat pain  NECK: No pain or stiffness  BREASTS: No pain, masses, or nipple discharge  RESPIRATORY: No cough, wheezing, chills or hemoptysis; No shortness of breath  CARDIOVASCULAR: No chest pain, palpitations, dizziness, or leg swelling  GASTROINTESTINAL: No abdominal or epigastric pain. No nausea, vomiting, or hematemesis; No diarrhea or constipation. No melena or hematochezia.  GENITOURINARY: No dysuria, frequency, hematuria, or incontinence  NEUROLOGICAL: No headaches, memory loss, loss of strength, numbness, or tremors  SKIN: No itching, burning, rashes, or lesions   LYMPH NODES: No enlarged glands  ENDOCRINE: No heat or cold intolerance; No hair loss; No polydipsia or polyuria  MUSCULOSKELETAL: No back pain  PSYCHIATRIC: No depression, anxiety, mood swings, or difficulty sleeping  HEME/LYMPH: No easy bruising, or bleeding gums  ALLERGY AND IMMUNOLOGIC: No hives or eczema    Vital Signs Last 24 Hrs  T(C): 36.8, Max: 36.8 (04-01 @ 19:17)  T(F): 98.3, Max: 98.3 (04-02 @ 07:30)  HR: 64 (64 - 77)  BP: 112/70 (101/67 - 115/67)  BP(mean): --  RR: 16 (14 - 16)  SpO2: 93% (93% - 97%)    PHYSICAL EXAM:  GENERAL: NAD, well-groomed, well-developed  HEAD:  Atraumatic, Normocephalic  EYES: EOMI, PERRLA, conjunctiva and sclera clear  ENMT: No tonsillar erythema, exudates, or enlargement; Moist mucous membranes, Good dentition, No lesions  NECK: Supple, No JVD, Normal thyroid  NERVOUS SYSTEM:  Alert & Oriented X3, Good concentration;  CHEST/LUNG: Clear to auscultation bilaterally; No rales, rhonchi, wheezing, or rubs  HEART: Regular rate and rhythm; No murmurs, rubs, or gallops  ABDOMEN: Soft, Nontender, Nondistended; Bowel sounds present  EXTREMITIES:  2+ Peripheral Pulses, No clubbing or cyanosis  LYMPH: No lymphadenopathy noted  SKIN: No rashes or lesions  INCISION:  Dressing dry and intact    LABS:                        12.1   9.8   )-----------( 204      ( 02 Apr 2017 06:49 )             34.8     02 Apr 2017 06:49    141    |  104    |  24     ----------------------------<  102    3.5     |  32     |  1.16     Ca    8.4        02 Apr 2017 06:49          Consultant(s) Notes Reviewed:  [x ] YES  [ ] NO    Care Discussed with Consultants/Other Providers [x ] YES  [ ] NO
Patient is a 51y old  Male who presents with a chief complaint of pain left hip (30 Mar 2017 17:45)    s/p left THR.  INTERVAL HPI/OVERNIGHT EVENTS:  feels better.    Pain Location & Control:   pain is controlled.    MEDICATIONS  (STANDING):  aspirin enteric coated 325milliGRAM(s) Oral daily  celecoxib 200milliGRAM(s) Oral two times a day with meals  cholecalciferol 1000Unit(s) Oral daily  fluticasone propionate 50 MICROgram(s)/spray Nasal Spray 1Spray(s) Alternating Nostrils daily  amLODIPine   Tablet 10milliGRAM(s) Oral daily  metoprolol succinate ER 50milliGRAM(s) Oral daily  simvastatin 10milliGRAM(s) Oral at bedtime  loratadine 10milliGRAM(s) Oral daily  losartan 100milliGRAM(s) Oral daily  pantoprazole    Tablet 40milliGRAM(s) Oral before breakfast  senna 2Tablet(s) Oral at bedtime  docusate sodium 100milliGRAM(s) Oral three times a day  acetaminophen   Tablet. 1000milliGRAM(s) Oral every 8 hours    MEDICATIONS  (PRN):  HYDROmorphone  Injectable 0.5milliGRAM(s) IV Push every 3 hours PRN Severe Pain (7 - 10)  oxyCODONE IR 5milliGRAM(s) Oral every 3 hours PRN Mild Pain (1 - 3)  oxyCODONE IR 10milliGRAM(s) Oral every 3 hours PRN Moderate Pain (4 - 6)  aluminum hydroxide/magnesium hydroxide/simethicone Suspension 30milliLiter(s) Oral four times a day PRN Indigestion  ondansetron Injectable 4milliGRAM(s) IV Push every 6 hours PRN Nausea and/or Vomiting  polyethylene glycol 3350 17Gram(s) Oral daily PRN Constipation  bisacodyl Suppository 10milliGRAM(s) Rectal daily PRN If no bowel movement by POD#2  magnesium hydroxide Suspension 30milliLiter(s) Oral daily PRN Constipation      Allergies    No Known Allergies    Intolerances        REVIEW OF SYSTEMS:  CONSTITUTIONAL: No fever, weight loss, or fatigue  EYES: No eye pain, visual disturbances, or discharge  ENMT:  No difficulty hearing, tinnitus, vertigo; No sinus or throat pain  NECK: No pain or stiffness  BREASTS: No pain, masses, or nipple discharge  RESPIRATORY: No cough, wheezing, chills or hemoptysis; No shortness of breath  CARDIOVASCULAR: No chest pain, palpitations, dizziness, or leg swelling  GASTROINTESTINAL: No abdominal or epigastric pain. No nausea, vomiting, or hematemesis; No diarrhea or constipation. No melena or hematochezia.  GENITOURINARY: No dysuria, frequency, hematuria, or incontinence  NEUROLOGICAL: No headaches, memory loss, loss of strength, numbness, or tremors  SKIN: No itching, burning, rashes, or lesions   LYMPH NODES: No enlarged glands  ENDOCRINE: No heat or cold intolerance; No hair loss; No polydipsia or polyuria  MUSCULOSKELETAL: No back pain  PSYCHIATRIC: No depression, anxiety, mood swings, or difficulty sleeping  HEME/LYMPH: No easy bruising, or bleeding gums  ALLERGY AND IMMUNOLOGIC: No hives or eczema    Vital Signs Last 24 Hrs  T(C): 36.6, Max: 36.9 (04-02 @ 15:06)  T(F): 97.8, Max: 98.4 (04-02 @ 15:06)  HR: 72 (61 - 90)  BP: 121/79 (95/62 - 121/79)  BP(mean): --  RR: 16 (14 - 16)  SpO2: 96% (93% - 99%)    PHYSICAL EXAM:  GENERAL: NAD, well-groomed, well-developed  HEAD:  Atraumatic, Normocephalic  EYES: EOMI, PERRLA, conjunctiva and sclera clear  ENMT: No tonsillar erythema, exudates, or enlargement; Moist mucous membranes, Good dentition, No lesions  NECK: Supple, No JVD, Normal thyroid  NERVOUS SYSTEM:  Alert & Oriented X3,    CHEST/LUNG: Clear to auscultation bilaterally; No rales, rhonchi, wheezing, or rubs  HEART: Regular rate and rhythm; No murmurs, rubs, or gallops  ABDOMEN: Soft, Nontender, Nondistended; Bowel sounds present  EXTREMITIES:  2+ Peripheral Pulses, No clubbing or cyanosis  LYMPH: No lymphadenopathy noted  SKIN: No rashes or lesions  INCISION:  Dressing dry and intact    LABS:                        11.2   9.2   )-----------( 224      ( 03 Apr 2017 06:29 )             34.5     03 Apr 2017 06:29    143    |  106    |  21     ----------------------------<  98     3.9     |  31     |  1.21     Ca    8.4        03 Apr 2017 06:29      RADIOLOGY & ADDITIONAL TESTS:    Consultant(s) Notes Reviewed:  [xx ] YES  [ ] NO    Care Discussed with Consultants/Other Providers [x ] YES  [ ] NO

## 2017-04-03 NOTE — DISCHARGE NOTE ADULT - MEDICATION SUMMARY - MEDICATIONS TO TAKE
I will START or STAY ON the medications listed below when I get home from the hospital:    Rolling walker  -- Dx: THR  -- Indication: For Assist with ambulation    3:1 commode  -- Dx: THR  -- Indication: For Assist  with toileting    Hip Kit  -- Dx: THR  -- Indication: For Assist with activities of daily living    celecoxib 200 mg oral capsule  -- 1 cap(s) by mouth 2 times a day  -- Do not take this drug if you are pregnant.  Medication should be taken with plenty of water.  Obtain medical advice before taking any non-prescription drugs as some may affect the action of this medication.  Take with food or milk.    -- Indication: For Prevent extra/heterotopic bone at hip    oxyCODONE 5 mg oral tablet  -- 1-2 tab(s) by mouth every 4-6 hours prn moderate to severe painMDD:8  -- Caution federal law prohibits the transfer of this drug to any person other  than the person for whom it was prescribed.  It is very important that you take or use this exactly as directed.  Do not skip doses or discontinue unless directed by your doctor.  May cause drowsiness.  Alcohol may intensify this effect.  Use care when operating dangerous machinery.  This prescription cannot be refilled.  Using more of this medication than prescribed may cause serious breathing problems.    -- Indication: For moderate to severe hip pain    acetaminophen 500 mg oral tablet  -- 2 tab(s) by mouth every 8 hours for 2 weeks maximum  -- Indication: For mild hip pain    aspirin 325 mg oral delayed release tablet  -- 1 tab(s) by mouth once a day for 19 more days;  then increase to every 12 hours for 21 additional days.  -- Indication: For Prevent blood clot/DVT    losartan 100 mg oral tablet  -- 1 tab(s) by mouth once a day  -- Indication: For High blood pressure    loratadine 10 mg oral capsule  -- 1 cap(s) by mouth once a day  -- Indication: For seasonal allergies    simvastatin 10 mg oral tablet  -- 1 tab(s) by mouth once a day (at bedtime)  -- Indication: For High cholesterol    metoprolol succinate 50 mg oral tablet, extended release  -- 1 tab(s) by mouth once a day  -- Indication: For High blood pressure    amLODIPine 10 mg oral tablet  -- 1 tab(s) by mouth once a day  -- Indication: For High blood pressure    Zinc 50 mg Pink oral capsule  -- 1 tab(s) by mouth once a day  -- Indication: For supplement    Flonase 50 mcg/inh nasal spray  -- 1 spray(s) into nose once a day  -- Indication: For seasonal allergies    pantoprazole 40 mg oral delayed release tablet  -- 1 tab(s) by mouth once a day (before a meal) while on Ecotrin twice daily.  -- Indication: For Prevent stomach ulcers while on high dose Ecotrin    Vitamin D3 1000 intl units oral capsule  -- 1 cap(s) by mouth once a day  -- Indication: For supplement

## 2017-04-03 NOTE — DISCHARGE NOTE ADULT - CARE PROVIDERS DIRECT ADDRESSES
[email or web address]

## 2017-04-03 NOTE — DISCHARGE NOTE ADULT - HOSPITAL COURSE
This 52yo male patient was admitted to [facility] on 3- with a history of severe degenerative joint disease of the left hip and for elective total joint replacement.  Patient had appropriate preop medical evaluation and testing.    Patient received antibiotics according to SCIP guidelines for infection prevention.  The patient underwent an uncomplicated left total hip replacement by Dr. Francisco Gonzalez on ***3-31.   Ecotrin was given for DVT prophylaxis; Celebrex for H.O. prevention.  Anesthesia, Medical Hospitalist, Physical Therapy and Occupational Therapy were consulted.  Patient is stable for discharge home with a good prognosis on 4-3-2017.  Appropriate discharge instructions and medications are provided in this document. This 52yo male patient was admitted to [facility] on 3- with a history of severe degenerative joint disease of the left hip and for elective total joint replacement.  Patient had appropriate preop medical evaluation and testing.    Patient received antibiotics according to SCIP guidelines for infection prevention.  The patient underwent an uncomplicated left total hip replacement by Dr. Francisco Gonzalez on 3-31.   Ecotrin was given for DVT prophylaxis; Celebrex for H.O. prevention.  Anesthesia, Medical Hospitalist, Physical Therapy and Occupational Therapy were consulted.  Patient is stable for discharge home with a good prognosis on 4-3-2017.  Appropriate discharge instructions and medications are provided in this document.

## 2017-04-03 NOTE — DISCHARGE NOTE ADULT - MEDICATION SUMMARY - MEDICATIONS TO STOP TAKING
I will STOP taking the medications listed below when I get home from the hospital:    prednisoLONE 10 mg oral tablet, disintegrating  -- 1 tab(s) by mouth once a day  -- last dose today for face rash    Pepcid 20 mg oral tablet  -- 1 tab(s) by mouth twice

## 2017-04-03 NOTE — PROGRESS NOTE ADULT - PROBLEM SELECTOR PLAN 2
controlled cont CPAP QHS and cont remote tele.

## 2017-04-03 NOTE — DISCHARGE NOTE ADULT - CARE PLAN
Principal Discharge DX:	Primary osteoarthritis of left hip  Goal:	Improve function and quality of life with reduced hip pain  Instructions for follow-up, activity and diet:	Physical Therapy /Occupational Therapy for: Ambulation, Transfers , Stairs, ADLs (activities of daily living)  LEFT TOTAL HIP PRECAUTIONS  *Remember to continue all of the precautions for total hip replacement. Your surgeon will tell you when and if you can move beyond these limitations.  • Do not bend your hip more than 90 degrees   • Do not cross your legs or ankles when laying,  sitting or standing.  • Do not raise your operated leg up with your knee straight.  • DO NOT bend over at your waist.  • Avoid sitting in low, soft chairs such as sofas and car seats. You should sit on a chair using firm pillows to raise the height of the seat.  • Make sure your bed level is high, so that you maintain proper leg positioning when sitting on the side, or getting in or out.  • When entering and traveling by car, sit in the front passenger seat. Make sure that the car seat is all the way back and semi-reclined before entering.  • Do not allow your knees to come together when sitting or lying in bed. Use abduction pillow.  • Do not take a tub bath yet.   • Do not resume driving until you have your surgeon’s permission.  Keep incision area clean and dry.  You may shower  if no drainage present.  Suture/prineo removal in 2 weeks in surgeons office.  Follow-up with private medical doctor 2-3 weeks after surgery for lab work/medical conditions.  Instructions for follow-up, activity and diet:	For Constipation :   • Increase your daily water intake.   • Try adding fiber to your diet by eating fruits, vegetables and foods that are rich in grains.  • If you do experience constipation, you may take an over-the-counter stool softener/laxative such as Colace, Senokot , Milk of Magnesia or Miralax.  - Call your doctor if you experience:  • An increase in pain not controlled by pain medication or change in activity or  position.  • Temperature greater than 101° F.  • Redness, increased swelling or foul smelling drainage from or around the surgical  incision.  • Numbness, tingling or a change in color or temperature of the operative leg.  • Call your doctor immediately if you experience chest pain, shortness of breath or calf pain.

## 2017-04-03 NOTE — DISCHARGE NOTE ADULT - NS AS ACTIVITY OBS
may shower if surgical wound dry/Do not drive or operate machinery/Stairs allowed/Showering allowed/Walking-Indoors allowed/Walking-Outdoors allowed

## 2017-04-03 NOTE — DISCHARGE NOTE ADULT - PATIENT PORTAL LINK FT
“You can access the [employer] Patient Portal, offered by [facility], by registering with the following website: [email or web address]”

## 2017-04-03 NOTE — DISCHARGE NOTE ADULT - PLAN OF CARE
Improve function and quality of life with reduced hip pain Physical Therapy /Occupational Therapy for: Ambulation, Transfers , Stairs, ADLs (activities of daily living)  LEFT TOTAL HIP PRECAUTIONS  *Remember to continue all of the precautions for total hip replacement. Your surgeon will tell you when and if you can move beyond these limitations.  • Do not bend your hip more than 90 degrees   • Do not cross your legs or ankles when laying,  sitting or standing.  • Do not raise your operated leg up with your knee straight.  • DO NOT bend over at your waist.  • Avoid sitting in low, soft chairs such as sofas and car seats. You should sit on a chair using firm pillows to raise the height of the seat.  • Make sure your bed level is high, so that you maintain proper leg positioning when sitting on the side, or getting in or out.  • When entering and traveling by car, sit in the front passenger seat. Make sure that the car seat is all the way back and semi-reclined before entering.  • Do not allow your knees to come together when sitting or lying in bed. Use abduction pillow.  • Do not take a tub bath yet.   • Do not resume driving until you have your surgeon’s permission.  Keep incision area clean and dry.  You may shower  if no drainage present.  Suture/prineo removal in 2 weeks in surgeons office.  Follow-up with private medical doctor 2-3 weeks after surgery for lab work/medical conditions. For Constipation :   • Increase your daily water intake.   • Try adding fiber to your diet by eating fruits, vegetables and foods that are rich in grains.  • If you do experience constipation, you may take an over-the-counter stool softener/laxative such as Colace, Senokot , Milk of Magnesia or Miralax.  - Call your doctor if you experience:  • An increase in pain not controlled by pain medication or change in activity or  position.  • Temperature greater than 101° F.  • Redness, increased swelling or foul smelling drainage from or around the surgical  incision.  • Numbness, tingling or a change in color or temperature of the operative leg.  • Call your doctor immediately if you experience chest pain, shortness of breath or calf pain.

## 2017-04-03 NOTE — DISCHARGE NOTE ADULT - HOME CARE AGENCY
[facility] - [phone]  Nurse to visit the day after hospital discharge; physical therapist to follow. Please contact the home care agency at the above phone number if you have not heard from them by 12 noon on the day after your hospital discharge.

## 2017-04-06 ENCOUNTER — EMERGENCY (EMERGENCY)
Facility: HOSPITAL | Age: 52
LOS: 1 days | Discharge: HOME | End: 2017-04-06
Attending: EMERGENCY MEDICINE | Admitting: EMERGENCY MEDICINE
Payer: COMMERCIAL

## 2017-04-06 VITALS
HEIGHT: 68 IN | SYSTOLIC BLOOD PRESSURE: 155 MMHG | OXYGEN SATURATION: 97 % | TEMPERATURE: 98 F | WEIGHT: 238.1 LBS | HEART RATE: 97 BPM | RESPIRATION RATE: 18 BRPM | DIASTOLIC BLOOD PRESSURE: 81 MMHG

## 2017-04-06 VITALS
OXYGEN SATURATION: 98 % | TEMPERATURE: 98 F | DIASTOLIC BLOOD PRESSURE: 67 MMHG | HEART RATE: 76 BPM | SYSTOLIC BLOOD PRESSURE: 123 MMHG | RESPIRATION RATE: 16 BRPM

## 2017-04-06 DIAGNOSIS — Z96.642 PRESENCE OF LEFT ARTIFICIAL HIP JOINT: Chronic | ICD-10-CM

## 2017-04-06 DIAGNOSIS — Z98.890 OTHER SPECIFIED POSTPROCEDURAL STATES: Chronic | ICD-10-CM

## 2017-04-06 LAB
ALBUMIN SERPL ELPH-MCNC: 2.9 G/DL — LOW (ref 3.3–5)
ALP SERPL-CCNC: 79 U/L — SIGNIFICANT CHANGE UP (ref 30–120)
ALT FLD-CCNC: 42 U/L DA — SIGNIFICANT CHANGE UP (ref 10–60)
ANION GAP SERPL CALC-SCNC: 9 MMOL/L — SIGNIFICANT CHANGE UP (ref 5–17)
APTT BLD: 31.3 SEC — SIGNIFICANT CHANGE UP (ref 27.5–37.4)
AST SERPL-CCNC: 45 U/L — HIGH (ref 10–40)
BASOPHILS # BLD AUTO: 0.1 K/UL — SIGNIFICANT CHANGE UP (ref 0–0.2)
BASOPHILS NFR BLD AUTO: 1.2 % — SIGNIFICANT CHANGE UP (ref 0–2)
BILIRUB SERPL-MCNC: 0.4 MG/DL — SIGNIFICANT CHANGE UP (ref 0.2–1.2)
BUN SERPL-MCNC: 20 MG/DL — SIGNIFICANT CHANGE UP (ref 7–23)
CALCIUM SERPL-MCNC: 8.7 MG/DL — SIGNIFICANT CHANGE UP (ref 8.4–10.5)
CHLORIDE SERPL-SCNC: 103 MMOL/L — SIGNIFICANT CHANGE UP (ref 96–108)
CO2 SERPL-SCNC: 27 MMOL/L — SIGNIFICANT CHANGE UP (ref 22–31)
CREAT SERPL-MCNC: 1.15 MG/DL — SIGNIFICANT CHANGE UP (ref 0.5–1.3)
EOSINOPHIL # BLD AUTO: 0.4 K/UL — SIGNIFICANT CHANGE UP (ref 0–0.5)
EOSINOPHIL NFR BLD AUTO: 4.9 % — SIGNIFICANT CHANGE UP (ref 0–6)
GLUCOSE SERPL-MCNC: 127 MG/DL — HIGH (ref 70–99)
HCT VFR BLD CALC: 34.8 % — LOW (ref 39–50)
HGB BLD-MCNC: 11.5 G/DL — LOW (ref 13–17)
INR BLD: 1.01 RATIO — SIGNIFICANT CHANGE UP (ref 0.88–1.16)
LYMPHOCYTES # BLD AUTO: 2 K/UL — SIGNIFICANT CHANGE UP (ref 1–3.3)
LYMPHOCYTES # BLD AUTO: 26.4 % — SIGNIFICANT CHANGE UP (ref 13–44)
MCHC RBC-ENTMCNC: 29.3 PG — SIGNIFICANT CHANGE UP (ref 27–34)
MCHC RBC-ENTMCNC: 33 GM/DL — SIGNIFICANT CHANGE UP (ref 32–36)
MCV RBC AUTO: 88.8 FL — SIGNIFICANT CHANGE UP (ref 80–100)
MONOCYTES # BLD AUTO: 0.8 K/UL — SIGNIFICANT CHANGE UP (ref 0–0.9)
MONOCYTES NFR BLD AUTO: 11.2 % — SIGNIFICANT CHANGE UP (ref 2–14)
NEUTROPHILS # BLD AUTO: 4.3 K/UL — SIGNIFICANT CHANGE UP (ref 1.8–7.4)
NEUTROPHILS NFR BLD AUTO: 56.3 % — SIGNIFICANT CHANGE UP (ref 43–77)
PLATELET # BLD AUTO: 338 K/UL — SIGNIFICANT CHANGE UP (ref 150–400)
POTASSIUM SERPL-MCNC: 3.7 MMOL/L — SIGNIFICANT CHANGE UP (ref 3.5–5.3)
POTASSIUM SERPL-SCNC: 3.7 MMOL/L — SIGNIFICANT CHANGE UP (ref 3.5–5.3)
PROT SERPL-MCNC: 7.2 G/DL — SIGNIFICANT CHANGE UP (ref 6–8.3)
PROTHROM AB SERPL-ACNC: 11 SEC — SIGNIFICANT CHANGE UP (ref 9.8–12.7)
RBC # BLD: 3.92 M/UL — LOW (ref 4.2–5.8)
RBC # FLD: 12.5 % — SIGNIFICANT CHANGE UP (ref 10.3–14.5)
SODIUM SERPL-SCNC: 139 MMOL/L — SIGNIFICANT CHANGE UP (ref 135–145)
WBC # BLD: 7.6 K/UL — SIGNIFICANT CHANGE UP (ref 3.8–10.5)
WBC # FLD AUTO: 7.6 K/UL — SIGNIFICANT CHANGE UP (ref 3.8–10.5)

## 2017-04-06 PROCEDURE — 96374 THER/PROPH/DIAG INJ IV PUSH: CPT

## 2017-04-06 PROCEDURE — 93971 EXTREMITY STUDY: CPT

## 2017-04-06 PROCEDURE — 85730 THROMBOPLASTIN TIME PARTIAL: CPT

## 2017-04-06 PROCEDURE — 96375 TX/PRO/DX INJ NEW DRUG ADDON: CPT

## 2017-04-06 PROCEDURE — 99284 EMERGENCY DEPT VISIT MOD MDM: CPT | Mod: 25

## 2017-04-06 PROCEDURE — 80053 COMPREHEN METABOLIC PANEL: CPT

## 2017-04-06 PROCEDURE — 99283 EMERGENCY DEPT VISIT LOW MDM: CPT

## 2017-04-06 PROCEDURE — 93971 EXTREMITY STUDY: CPT | Mod: 26,LT

## 2017-04-06 PROCEDURE — 85610 PROTHROMBIN TIME: CPT

## 2017-04-06 PROCEDURE — 85027 COMPLETE CBC AUTOMATED: CPT

## 2017-04-06 PROCEDURE — 73630 X-RAY EXAM OF FOOT: CPT | Mod: 26,LT

## 2017-04-06 PROCEDURE — 73630 X-RAY EXAM OF FOOT: CPT

## 2017-04-06 RX ORDER — MORPHINE SULFATE 50 MG/1
4 CAPSULE, EXTENDED RELEASE ORAL ONCE
Qty: 0 | Refills: 0 | Status: DISCONTINUED | OUTPATIENT
Start: 2017-04-06 | End: 2017-04-06

## 2017-04-06 RX ORDER — HYDROMORPHONE HYDROCHLORIDE 2 MG/ML
1 INJECTION INTRAMUSCULAR; INTRAVENOUS; SUBCUTANEOUS ONCE
Qty: 0 | Refills: 0 | Status: DISCONTINUED | OUTPATIENT
Start: 2017-04-06 | End: 2017-04-06

## 2017-04-06 RX ADMIN — HYDROMORPHONE HYDROCHLORIDE 1 MILLIGRAM(S): 2 INJECTION INTRAMUSCULAR; INTRAVENOUS; SUBCUTANEOUS at 01:42

## 2017-04-06 RX ADMIN — MORPHINE SULFATE 4 MILLIGRAM(S): 50 CAPSULE, EXTENDED RELEASE ORAL at 01:25

## 2017-04-06 RX ADMIN — MORPHINE SULFATE 4 MILLIGRAM(S): 50 CAPSULE, EXTENDED RELEASE ORAL at 01:02

## 2017-04-06 NOTE — ED ADULT NURSE REASSESSMENT NOTE - NS ED NURSE REASSESS COMMENT FT1
pt resting comfortably, left LE doppler negative for DVT, pt states pain level 3/10 now, able to move left foot ,+pulses. left leg swelling present,  left hip surgical wound dry and intact.  pt re evaluated by MD. pt denies SOB or CP, ambulated with walker.

## 2017-04-06 NOTE — ED PROVIDER NOTE - OBJECTIVE STATEMENT
Pt. c/o pain and swelling to right lower extremity. Has had some swelling s/p hip replacement 5-6 days ago. Had PT today, and pain/swelling much more severe since. Maximum pain/swelling is in foot. No fever. No CP/SOB

## 2017-04-12 ENCOUNTER — CHART COPY (OUTPATIENT)
Age: 52
End: 2017-04-12

## 2017-04-12 ENCOUNTER — APPOINTMENT (OUTPATIENT)
Dept: ORTHOPEDIC SURGERY | Facility: CLINIC | Age: 52
End: 2017-04-12

## 2017-05-03 ENCOUNTER — APPOINTMENT (OUTPATIENT)
Dept: ORTHOPEDIC SURGERY | Facility: CLINIC | Age: 52
End: 2017-05-03

## 2017-05-10 ENCOUNTER — APPOINTMENT (OUTPATIENT)
Dept: INTERNAL MEDICINE | Facility: CLINIC | Age: 52
End: 2017-05-10

## 2017-05-10 ENCOUNTER — CHART COPY (OUTPATIENT)
Age: 52
End: 2017-05-10

## 2017-05-10 VITALS
DIASTOLIC BLOOD PRESSURE: 80 MMHG | BODY MASS INDEX: 38.45 KG/M2 | SYSTOLIC BLOOD PRESSURE: 114 MMHG | HEIGHT: 67 IN | HEART RATE: 71 BPM | WEIGHT: 245 LBS | TEMPERATURE: 97.8 F | OXYGEN SATURATION: 96 % | RESPIRATION RATE: 14 BRPM

## 2017-05-25 ENCOUNTER — APPOINTMENT (OUTPATIENT)
Dept: INTERNAL MEDICINE | Facility: CLINIC | Age: 52
End: 2017-05-25

## 2017-05-25 VITALS
HEIGHT: 67 IN | SYSTOLIC BLOOD PRESSURE: 140 MMHG | TEMPERATURE: 97.6 F | BODY MASS INDEX: 38.61 KG/M2 | RESPIRATION RATE: 14 BRPM | WEIGHT: 246 LBS | DIASTOLIC BLOOD PRESSURE: 90 MMHG | OXYGEN SATURATION: 96 % | HEART RATE: 72 BPM

## 2017-05-25 VITALS — SYSTOLIC BLOOD PRESSURE: 130 MMHG | DIASTOLIC BLOOD PRESSURE: 80 MMHG

## 2017-05-31 ENCOUNTER — APPOINTMENT (OUTPATIENT)
Dept: ORTHOPEDIC SURGERY | Facility: CLINIC | Age: 52
End: 2017-05-31

## 2017-05-31 VITALS
HEIGHT: 67 IN | DIASTOLIC BLOOD PRESSURE: 88 MMHG | WEIGHT: 246 LBS | HEART RATE: 66 BPM | SYSTOLIC BLOOD PRESSURE: 138 MMHG | BODY MASS INDEX: 38.61 KG/M2

## 2017-05-31 DIAGNOSIS — M22.42 CHONDROMALACIA PATELLAE, LEFT KNEE: ICD-10-CM

## 2017-06-30 ENCOUNTER — APPOINTMENT (OUTPATIENT)
Dept: INTERNAL MEDICINE | Facility: CLINIC | Age: 52
End: 2017-06-30

## 2017-06-30 VITALS
HEIGHT: 67 IN | RESPIRATION RATE: 14 BRPM | OXYGEN SATURATION: 97 % | TEMPERATURE: 97.9 F | DIASTOLIC BLOOD PRESSURE: 80 MMHG | WEIGHT: 238 LBS | BODY MASS INDEX: 37.35 KG/M2 | SYSTOLIC BLOOD PRESSURE: 130 MMHG | HEART RATE: 67 BPM

## 2017-06-30 RX ORDER — OXYCODONE 5 MG/1
5 TABLET ORAL
Qty: 90 | Refills: 0 | Status: DISCONTINUED | COMMUNITY
Start: 2017-04-12 | End: 2017-06-30

## 2017-06-30 RX ORDER — OXYCODONE HYDROCHLORIDE 5 MG/1
5 CAPSULE ORAL
Qty: 90 | Refills: 0 | Status: DISCONTINUED | COMMUNITY
Start: 2017-04-12 | End: 2017-06-30

## 2017-07-05 LAB
25(OH)D3 SERPL-MCNC: 38.3 NG/ML
ALBUMIN SERPL ELPH-MCNC: 4.2 G/DL
ALP BLD-CCNC: 110 U/L
ALT SERPL-CCNC: 26 U/L
ANION GAP SERPL CALC-SCNC: 16 MMOL/L
APPEARANCE: CLEAR
AST SERPL-CCNC: 20 U/L
BACTERIA: NEGATIVE
BASOPHILS # BLD AUTO: 0.02 K/UL
BASOPHILS NFR BLD AUTO: 0.3 %
BILIRUB SERPL-MCNC: 0.4 MG/DL
BILIRUBIN URINE: ABNORMAL
BLOOD URINE: NEGATIVE
BUN SERPL-MCNC: 17 MG/DL
CALCIUM SERPL-MCNC: 9.2 MG/DL
CHLORIDE SERPL-SCNC: 103 MMOL/L
CHOLEST SERPL-MCNC: 109 MG/DL
CHOLEST/HDLC SERPL: 2.5 RATIO
CO2 SERPL-SCNC: 24 MMOL/L
COLOR: ABNORMAL
CREAT SERPL-MCNC: 1.08 MG/DL
EOSINOPHIL # BLD AUTO: 0.07 K/UL
EOSINOPHIL NFR BLD AUTO: 1.1 %
FOLATE SERPL-MCNC: >20 NG/ML
GLUCOSE QUALITATIVE U: NORMAL MG/DL
GLUCOSE SERPL-MCNC: 94 MG/DL
HBA1C MFR BLD HPLC: 5.5 %
HCT VFR BLD CALC: 43.6 %
HDLC SERPL-MCNC: 43 MG/DL
HEMOCCULT STL QL IA: NEGATIVE
HGB BLD-MCNC: 14.4 G/DL
HYALINE CASTS: 6 /LPF
IMM GRANULOCYTES NFR BLD AUTO: 0 %
KETONES URINE: ABNORMAL
LDLC SERPL CALC-MCNC: 53 MG/DL
LEUKOCYTE ESTERASE URINE: NEGATIVE
LYMPHOCYTES # BLD AUTO: 1.7 K/UL
LYMPHOCYTES NFR BLD AUTO: 26.7 %
MAN DIFF?: NORMAL
MCHC RBC-ENTMCNC: 28.5 PG
MCHC RBC-ENTMCNC: 33 GM/DL
MCV RBC AUTO: 86.2 FL
MICROSCOPIC-UA: NORMAL
MONOCYTES # BLD AUTO: 0.54 K/UL
MONOCYTES NFR BLD AUTO: 8.5 %
NEUTROPHILS # BLD AUTO: 4.03 K/UL
NEUTROPHILS NFR BLD AUTO: 63.4 %
NITRITE URINE: NEGATIVE
PH URINE: 6
PLATELET # BLD AUTO: 269 K/UL
POTASSIUM SERPL-SCNC: 4.2 MMOL/L
PROT SERPL-MCNC: 7.7 G/DL
PROTEIN URINE: ABNORMAL MG/DL
PSA SERPL-MCNC: 0.74 NG/ML
RBC # BLD: 5.06 M/UL
RBC # FLD: 15.2 %
RED BLOOD CELLS URINE: 2 /HPF
SODIUM SERPL-SCNC: 143 MMOL/L
SPECIFIC GRAVITY URINE: 1.03
SQUAMOUS EPITHELIAL CELLS: 3 /HPF
TRIGL SERPL-MCNC: 63 MG/DL
TSH SERPL-ACNC: 1.12 UIU/ML
UROBILINOGEN URINE: NORMAL MG/DL
VIT B12 SERPL-MCNC: 670 PG/ML
WBC # FLD AUTO: 6.36 K/UL
WHITE BLOOD CELLS URINE: 3 /HPF

## 2017-07-19 ENCOUNTER — APPOINTMENT (OUTPATIENT)
Dept: ORTHOPEDIC SURGERY | Facility: CLINIC | Age: 52
End: 2017-07-19

## 2017-07-19 VITALS
DIASTOLIC BLOOD PRESSURE: 98 MMHG | WEIGHT: 238 LBS | HEART RATE: 67 BPM | SYSTOLIC BLOOD PRESSURE: 137 MMHG | BODY MASS INDEX: 37.35 KG/M2 | HEIGHT: 67 IN

## 2017-07-19 DIAGNOSIS — M70.62 TROCHANTERIC BURSITIS, LEFT HIP: ICD-10-CM

## 2017-07-22 PROBLEM — M70.62 GREATER TROCHANTERIC BURSITIS OF LEFT HIP: Status: ACTIVE | Noted: 2017-07-22

## 2017-07-25 ENCOUNTER — APPOINTMENT (OUTPATIENT)
Dept: CARDIOLOGY | Facility: CLINIC | Age: 52
End: 2017-07-25
Payer: COMMERCIAL

## 2017-07-25 ENCOUNTER — NON-APPOINTMENT (OUTPATIENT)
Age: 52
End: 2017-07-25

## 2017-07-25 VITALS
BODY MASS INDEX: 37.67 KG/M2 | WEIGHT: 240 LBS | OXYGEN SATURATION: 95 % | SYSTOLIC BLOOD PRESSURE: 162 MMHG | HEIGHT: 67 IN | DIASTOLIC BLOOD PRESSURE: 95 MMHG | HEART RATE: 78 BPM

## 2017-07-25 PROCEDURE — 99214 OFFICE O/P EST MOD 30 MIN: CPT

## 2017-07-25 PROCEDURE — 93000 ELECTROCARDIOGRAM COMPLETE: CPT

## 2017-07-25 RX ORDER — ASPIRIN 325 MG/1
325 TABLET, FILM COATED ORAL
Refills: 0 | Status: DISCONTINUED | COMMUNITY
End: 2017-07-25

## 2017-08-12 ENCOUNTER — RX RENEWAL (OUTPATIENT)
Age: 52
End: 2017-08-12

## 2017-08-12 ENCOUNTER — MEDICATION RENEWAL (OUTPATIENT)
Age: 52
End: 2017-08-12

## 2017-09-05 ENCOUNTER — APPOINTMENT (OUTPATIENT)
Dept: CARDIOLOGY | Facility: CLINIC | Age: 52
End: 2017-09-05
Payer: COMMERCIAL

## 2017-09-05 PROCEDURE — 93015 CV STRESS TEST SUPVJ I&R: CPT

## 2017-09-06 ENCOUNTER — APPOINTMENT (OUTPATIENT)
Dept: CARDIOLOGY | Facility: CLINIC | Age: 52
End: 2017-09-06
Payer: COMMERCIAL

## 2017-09-06 PROCEDURE — 93306 TTE W/DOPPLER COMPLETE: CPT

## 2017-10-05 ENCOUNTER — APPOINTMENT (OUTPATIENT)
Dept: ULTRASOUND IMAGING | Facility: CLINIC | Age: 52
End: 2017-10-05

## 2017-10-05 ENCOUNTER — OUTPATIENT (OUTPATIENT)
Dept: OUTPATIENT SERVICES | Facility: HOSPITAL | Age: 52
LOS: 1 days | End: 2017-10-05
Payer: COMMERCIAL

## 2017-10-05 DIAGNOSIS — Z96.642 PRESENCE OF LEFT ARTIFICIAL HIP JOINT: Chronic | ICD-10-CM

## 2017-10-05 DIAGNOSIS — Z00.8 ENCOUNTER FOR OTHER GENERAL EXAMINATION: ICD-10-CM

## 2017-10-05 DIAGNOSIS — Z98.890 OTHER SPECIFIED POSTPROCEDURAL STATES: Chronic | ICD-10-CM

## 2017-10-05 PROCEDURE — 20611 DRAIN/INJ JOINT/BURSA W/US: CPT | Mod: LT

## 2017-10-05 PROCEDURE — 20611 DRAIN/INJ JOINT/BURSA W/US: CPT

## 2017-10-16 ENCOUNTER — APPOINTMENT (OUTPATIENT)
Dept: VASCULAR SURGERY | Facility: CLINIC | Age: 52
End: 2017-10-16
Payer: COMMERCIAL

## 2017-10-16 ENCOUNTER — APPOINTMENT (OUTPATIENT)
Dept: VASCULAR SURGERY | Facility: CLINIC | Age: 52
End: 2017-10-16

## 2017-10-16 VITALS
SYSTOLIC BLOOD PRESSURE: 137 MMHG | TEMPERATURE: 98.2 F | HEART RATE: 70 BPM | WEIGHT: 240 LBS | DIASTOLIC BLOOD PRESSURE: 87 MMHG | BODY MASS INDEX: 36.37 KG/M2 | HEIGHT: 68 IN

## 2017-10-16 PROCEDURE — 99204 OFFICE O/P NEW MOD 45 MIN: CPT | Mod: 25

## 2017-10-23 ENCOUNTER — APPOINTMENT (OUTPATIENT)
Dept: VASCULAR SURGERY | Facility: CLINIC | Age: 52
End: 2017-10-23
Payer: COMMERCIAL

## 2017-10-23 PROCEDURE — 93970 EXTREMITY STUDY: CPT

## 2017-10-23 PROCEDURE — 93880 EXTRACRANIAL BILAT STUDY: CPT

## 2017-11-27 ENCOUNTER — RX RENEWAL (OUTPATIENT)
Age: 52
End: 2017-11-27

## 2018-02-08 ENCOUNTER — APPOINTMENT (OUTPATIENT)
Dept: INTERNAL MEDICINE | Facility: CLINIC | Age: 53
End: 2018-02-08
Payer: COMMERCIAL

## 2018-02-08 VITALS
DIASTOLIC BLOOD PRESSURE: 80 MMHG | BODY MASS INDEX: 35.31 KG/M2 | RESPIRATION RATE: 14 BRPM | HEART RATE: 67 BPM | OXYGEN SATURATION: 97 % | TEMPERATURE: 97.5 F | HEIGHT: 68 IN | SYSTOLIC BLOOD PRESSURE: 140 MMHG | WEIGHT: 233 LBS

## 2018-02-08 VITALS — SYSTOLIC BLOOD PRESSURE: 132 MMHG | DIASTOLIC BLOOD PRESSURE: 82 MMHG

## 2018-02-08 DIAGNOSIS — L30.9 DERMATITIS, UNSPECIFIED: ICD-10-CM

## 2018-02-08 PROCEDURE — 99214 OFFICE O/P EST MOD 30 MIN: CPT

## 2018-02-09 LAB
ALBUMIN SERPL ELPH-MCNC: 4.1 G/DL
ALP BLD-CCNC: 96 U/L
ALT SERPL-CCNC: 26 U/L
ANION GAP SERPL CALC-SCNC: 15 MMOL/L
AST SERPL-CCNC: 24 U/L
BILIRUB SERPL-MCNC: 0.6 MG/DL
BUN SERPL-MCNC: 16 MG/DL
CALCIUM SERPL-MCNC: 9.4 MG/DL
CHLORIDE SERPL-SCNC: 103 MMOL/L
CHOLEST SERPL-MCNC: 103 MG/DL
CHOLEST/HDLC SERPL: 2.6 RATIO
CO2 SERPL-SCNC: 24 MMOL/L
CREAT SERPL-MCNC: 1.11 MG/DL
GLUCOSE SERPL-MCNC: 101 MG/DL
HBA1C MFR BLD HPLC: 5.4 %
HDLC SERPL-MCNC: 39 MG/DL
LDLC SERPL CALC-MCNC: 50 MG/DL
POTASSIUM SERPL-SCNC: 4 MMOL/L
PROT SERPL-MCNC: 8.2 G/DL
SODIUM SERPL-SCNC: 142 MMOL/L
TRIGL SERPL-MCNC: 69 MG/DL

## 2018-03-12 NOTE — PROGRESS NOTE ADULT - ATTENDING COMMENTS
Health Maintenance Summary     Topic Due On Due Status Completed On Postpone Until Reason    IMMUNIZATION - HPV   Aged Out       IMMUNIZATION - DTaP/Tdap/Td Nov 29, 2027 Not Due Nov 29, 2017      Immunization-Influenza Sep 1, 2017 Postponed  Apr 9, 2018 Patient Refused          Patient is up to date, no discussion needed .             Patient seen by me. Agree with above progress note.

## 2018-06-09 NOTE — H&P PST ADULT - PAIN DESCRIPTION (FREQUENCY/QUALITY), PROFILE
[facility]  Magnesium Check Note    S:   Patient is feeling well; denies concerns.  Denies headache, vision changes/spots in vision, chest pain, shortness of breath, RUQ or epigastric pain.    O:  Patient Vitals for the past 4 hrs:   BP Temp Temp src Pulse Resp   18 1000 (!) 139/91 98  F (36.7  C) Oral 71 16     Gen: A&O, NAD  CV:  RRR, no murmurs  Pulm:  CTAB, no wheezes or crackles  Abd:  Soft, non-tender in upper abdomen  Ext:  Patellar reflexes 1+ b/l, no clonus b/l, 1+ LE edema b/l    I/O:  I/O last 3 completed shifts:  In: 4465.35 [P.O.:1080; I.V.:3385.35]  Out: 5178 [Urine:4910; Blood:268]    A/P:  Ana Lilia Arrington is a 31 year old  on PPD#1 s/p , with pregnancy complicated by preeclampsia with severe features.    Preeclampsia with severe features   - BP: mild range.   - UOP: adequate, strict Is/Os, daily weights   - Symptoms: Denies   - HELLP labs wnl, last checked this PM.   - Mag sulfate for seizure prophylaxis: S/p 4 g > 2g/hr, no s/s of magnesium toxicity, lvl 6.0  Postpartum:  - Routine postpartum care    Magda Santoro MD  Ob/Gyn, PGY-3     intermittent

## 2018-07-31 ENCOUNTER — APPOINTMENT (OUTPATIENT)
Dept: INTERNAL MEDICINE | Facility: CLINIC | Age: 53
End: 2018-07-31
Payer: COMMERCIAL

## 2018-07-31 ENCOUNTER — NON-APPOINTMENT (OUTPATIENT)
Age: 53
End: 2018-07-31

## 2018-07-31 VITALS
DIASTOLIC BLOOD PRESSURE: 80 MMHG | HEART RATE: 68 BPM | OXYGEN SATURATION: 97 % | HEIGHT: 68 IN | WEIGHT: 230 LBS | TEMPERATURE: 98 F | BODY MASS INDEX: 34.86 KG/M2 | RESPIRATION RATE: 14 BRPM | SYSTOLIC BLOOD PRESSURE: 140 MMHG

## 2018-07-31 VITALS — DIASTOLIC BLOOD PRESSURE: 78 MMHG | SYSTOLIC BLOOD PRESSURE: 118 MMHG

## 2018-07-31 PROCEDURE — 99396 PREV VISIT EST AGE 40-64: CPT | Mod: 25

## 2018-07-31 PROCEDURE — 93000 ELECTROCARDIOGRAM COMPLETE: CPT

## 2018-07-31 NOTE — ASSESSMENT
[FreeTextEntry1] : HTN- continue current Meds\par Hyperlipidemia- check labs and EKG\par HC UT\par Check labs.

## 2018-07-31 NOTE — HEALTH RISK ASSESSMENT
[Very Good] : ~his/her~  mood as very good [0] : 2) Feeling down, depressed, or hopeless: Not at all (0) [Patient reported colonoscopy was normal] : Patient reported colonoscopy was normal [ColonoscopyDate] : 02/18

## 2018-08-01 LAB
25(OH)D3 SERPL-MCNC: 38 NG/ML
ALBUMIN SERPL ELPH-MCNC: 4.1 G/DL
ALP BLD-CCNC: 89 U/L
ALT SERPL-CCNC: 28 U/L
ANION GAP SERPL CALC-SCNC: 13 MMOL/L
APPEARANCE: CLEAR
AST SERPL-CCNC: 31 U/L
BACTERIA: NEGATIVE
BASOPHILS # BLD AUTO: 0.02 K/UL
BASOPHILS NFR BLD AUTO: 0.3 %
BILIRUB SERPL-MCNC: 0.6 MG/DL
BILIRUBIN URINE: ABNORMAL
BLOOD URINE: NEGATIVE
BUN SERPL-MCNC: 16 MG/DL
CALCIUM SERPL-MCNC: 9 MG/DL
CHLORIDE SERPL-SCNC: 102 MMOL/L
CHOLEST SERPL-MCNC: 122 MG/DL
CHOLEST/HDLC SERPL: 2.9 RATIO
CO2 SERPL-SCNC: 25 MMOL/L
COLOR: ABNORMAL
CREAT SERPL-MCNC: 0.9 MG/DL
EOSINOPHIL # BLD AUTO: 0.07 K/UL
EOSINOPHIL NFR BLD AUTO: 1.1 %
FOLATE SERPL-MCNC: 19 NG/ML
GLUCOSE QUALITATIVE U: NEGATIVE MG/DL
GLUCOSE SERPL-MCNC: 101 MG/DL
HBA1C MFR BLD HPLC: 5.4 %
HCT VFR BLD CALC: 46.1 %
HDLC SERPL-MCNC: 42 MG/DL
HGB BLD-MCNC: 15.8 G/DL
IMM GRANULOCYTES NFR BLD AUTO: 0.2 %
KETONES URINE: ABNORMAL
LDLC SERPL CALC-MCNC: 65 MG/DL
LEUKOCYTE ESTERASE URINE: NEGATIVE
LYMPHOCYTES # BLD AUTO: 1.79 K/UL
LYMPHOCYTES NFR BLD AUTO: 27.4 %
MAN DIFF?: NORMAL
MCHC RBC-ENTMCNC: 30.1 PG
MCHC RBC-ENTMCNC: 34.3 GM/DL
MCV RBC AUTO: 87.8 FL
MICROSCOPIC-UA: NORMAL
MONOCYTES # BLD AUTO: 0.52 K/UL
MONOCYTES NFR BLD AUTO: 8 %
NEUTROPHILS # BLD AUTO: 4.12 K/UL
NEUTROPHILS NFR BLD AUTO: 63 %
NITRITE URINE: NEGATIVE
PH URINE: 6
PLATELET # BLD AUTO: 268 K/UL
POTASSIUM SERPL-SCNC: 3.8 MMOL/L
PROT SERPL-MCNC: 7.9 G/DL
PROTEIN URINE: ABNORMAL MG/DL
PSA SERPL-MCNC: 0.89 NG/ML
RBC # BLD: 5.25 M/UL
RBC # FLD: 13.8 %
RED BLOOD CELLS URINE: 2 /HPF
SODIUM SERPL-SCNC: 140 MMOL/L
SPECIFIC GRAVITY URINE: 1.02
SQUAMOUS EPITHELIAL CELLS: 1 /HPF
TRIGL SERPL-MCNC: 77 MG/DL
TSH SERPL-ACNC: 1.18 UIU/ML
UROBILINOGEN URINE: NEGATIVE MG/DL
VIT B12 SERPL-MCNC: 585 PG/ML
WBC # FLD AUTO: 6.53 K/UL
WHITE BLOOD CELLS URINE: 1 /HPF

## 2018-08-07 LAB — HEMOCCULT STL QL IA: NEGATIVE

## 2018-08-08 PROBLEM — J30.2 OTHER SEASONAL ALLERGIC RHINITIS: Chronic | Status: ACTIVE | Noted: 2017-03-21

## 2018-08-08 PROBLEM — I10 ESSENTIAL (PRIMARY) HYPERTENSION: Chronic | Status: ACTIVE | Noted: 2017-03-21

## 2018-08-08 PROBLEM — G47.33 OBSTRUCTIVE SLEEP APNEA (ADULT) (PEDIATRIC): Chronic | Status: ACTIVE | Noted: 2017-03-21

## 2018-08-09 ENCOUNTER — APPOINTMENT (OUTPATIENT)
Dept: CARDIOLOGY | Facility: CLINIC | Age: 53
End: 2018-08-09
Payer: COMMERCIAL

## 2018-08-09 VITALS
BODY MASS INDEX: 34.86 KG/M2 | DIASTOLIC BLOOD PRESSURE: 94 MMHG | OXYGEN SATURATION: 96 % | WEIGHT: 230 LBS | SYSTOLIC BLOOD PRESSURE: 161 MMHG | HEART RATE: 58 BPM | HEIGHT: 68 IN

## 2018-08-09 PROCEDURE — 93000 ELECTROCARDIOGRAM COMPLETE: CPT

## 2018-08-09 PROCEDURE — 99214 OFFICE O/P EST MOD 30 MIN: CPT

## 2018-10-17 ENCOUNTER — APPOINTMENT (OUTPATIENT)
Dept: ORTHOPEDIC SURGERY | Facility: CLINIC | Age: 53
End: 2018-10-17
Payer: COMMERCIAL

## 2018-10-17 VITALS
WEIGHT: 229 LBS | BODY MASS INDEX: 34.71 KG/M2 | HEIGHT: 68 IN | SYSTOLIC BLOOD PRESSURE: 148 MMHG | HEART RATE: 69 BPM | DIASTOLIC BLOOD PRESSURE: 82 MMHG

## 2018-10-17 DIAGNOSIS — M79.652 PAIN IN LEFT THIGH: ICD-10-CM

## 2018-10-17 PROCEDURE — 20610 DRAIN/INJ JOINT/BURSA W/O US: CPT | Mod: LT

## 2018-10-17 PROCEDURE — 99214 OFFICE O/P EST MOD 30 MIN: CPT | Mod: 25

## 2018-10-17 PROCEDURE — 73502 X-RAY EXAM HIP UNI 2-3 VIEWS: CPT

## 2018-10-19 PROBLEM — M79.652 THIGH PAIN, MUSCULOSKELETAL, LEFT: Status: ACTIVE | Noted: 2018-10-19

## 2018-10-27 ENCOUNTER — TRANSCRIPTION ENCOUNTER (OUTPATIENT)
Age: 53
End: 2018-10-27

## 2019-01-11 ENCOUNTER — OUTPATIENT (OUTPATIENT)
Dept: OUTPATIENT SERVICES | Facility: HOSPITAL | Age: 54
LOS: 1 days | End: 2019-01-11
Payer: COMMERCIAL

## 2019-01-11 ENCOUNTER — APPOINTMENT (OUTPATIENT)
Dept: MRI IMAGING | Facility: CLINIC | Age: 54
End: 2019-01-11
Payer: COMMERCIAL

## 2019-01-11 DIAGNOSIS — Z96.642 PRESENCE OF LEFT ARTIFICIAL HIP JOINT: Chronic | ICD-10-CM

## 2019-01-11 DIAGNOSIS — Z00.8 ENCOUNTER FOR OTHER GENERAL EXAMINATION: ICD-10-CM

## 2019-01-11 DIAGNOSIS — Z98.890 OTHER SPECIFIED POSTPROCEDURAL STATES: Chronic | ICD-10-CM

## 2019-01-11 PROCEDURE — 73721 MRI JNT OF LWR EXTRE W/O DYE: CPT | Mod: 26,LT

## 2019-01-11 PROCEDURE — 73721 MRI JNT OF LWR EXTRE W/O DYE: CPT

## 2019-01-30 ENCOUNTER — APPOINTMENT (OUTPATIENT)
Dept: ORTHOPEDIC SURGERY | Facility: CLINIC | Age: 54
End: 2019-01-30
Payer: COMMERCIAL

## 2019-01-30 VITALS — BODY MASS INDEX: 34.71 KG/M2 | HEIGHT: 68 IN | WEIGHT: 229 LBS

## 2019-01-30 DIAGNOSIS — Z96.642 PRESENCE OF LEFT ARTIFICIAL HIP JOINT: ICD-10-CM

## 2019-01-30 DIAGNOSIS — K40.90 UNILATERAL INGUINAL HERNIA, W/OUT OBSTRUCTION OR GANGRENE, NOT SPECIFIED AS RECURRENT: ICD-10-CM

## 2019-01-30 PROCEDURE — 99213 OFFICE O/P EST LOW 20 MIN: CPT

## 2019-01-31 PROBLEM — Z96.642 STATUS POST LEFT HIP REPLACEMENT: Status: ACTIVE | Noted: 2017-05-10

## 2019-02-01 ENCOUNTER — APPOINTMENT (OUTPATIENT)
Dept: SURGERY | Facility: CLINIC | Age: 54
End: 2019-02-01
Payer: COMMERCIAL

## 2019-02-01 ENCOUNTER — APPOINTMENT (OUTPATIENT)
Dept: SURGERY | Facility: CLINIC | Age: 54
End: 2019-02-01

## 2019-02-01 VITALS
HEIGHT: 68 IN | BODY MASS INDEX: 34.86 KG/M2 | SYSTOLIC BLOOD PRESSURE: 145 MMHG | WEIGHT: 230 LBS | DIASTOLIC BLOOD PRESSURE: 90 MMHG | HEART RATE: 72 BPM

## 2019-02-01 DIAGNOSIS — G47.30 SLEEP APNEA, UNSPECIFIED: ICD-10-CM

## 2019-02-01 PROBLEM — Z00.00 ENCOUNTER FOR PREVENTIVE HEALTH EXAMINATION: Noted: 2019-02-01

## 2019-02-01 PROCEDURE — 99205 OFFICE O/P NEW HI 60 MIN: CPT

## 2019-02-01 NOTE — PHYSICAL EXAM
[Normal Breath Sounds] : Normal breath sounds [Normal Heart Sounds] : normal heart sounds [Normal Rate and Rhythm] : normal rate and rhythm [No Rash or Lesion] : No rash or lesion [Alert] : alert [Oriented to Person] : oriented to person [Oriented to Place] : oriented to place [Oriented to Time] : oriented to time [Calm] : calm [de-identified] : Obese gentleman in no distress [de-identified] : EMMANUEL HINES EOMI [de-identified] : Obese, soft, nontender, nondistended, positive bowel sounds in all four quadrants.  Moderate sized fat containing left inguinal hernia.  Soft and reducible.  Non-tender.  no radiating symptoms.   [de-identified] : Testes descended bilaterally, smooth and symmetrical. [de-identified] : Ambulating without assistance

## 2019-02-01 NOTE — HISTORY OF PRESENT ILLNESS
[de-identified] : s/p Total left hip arthroplasty in 2017 now with left thigh and persistent hip discomfort.  Was sent for MRI for furhter assessment and found to have fat containing left hinguinal hernia.  Concern this may be contributing to his symtpoms.

## 2019-02-01 NOTE — ASSESSMENT
[FreeTextEntry1] : 53 year old obese gentleman with left inguinal hernia first noted on MRI of hip.  Remains asymptomatic in the groin but has had persistent discomfort in thigh and hip since his total arthroplasty in 2017.\par \par Surgical repair has been recommended however given his obesity open repair wound be most favorable.\par Risk, Benefits, and Alternatives to surgery have been discussed.  This includes but is not limited to bleeding, infection, damage to adjacent structures, need for additional surgery or interventions, adverse effects of anesthesia such as cardio-respiratory complications, prolonged intubation, cardiac arrhythmia, arrest, and or death.  Risks of forgoing surgery have also been discussed including progression of, and/or worsening of current condition which may then require urgent or emergent treatment or surgery.\par \par Fat containing inguinal hernia would not likely cause any acute sequela or risk of incarceration or strangulation however Signs and symptoms of incarceration/strangulation/obstruction have been reviewed with the patient.  Should such symptoms present, urgent medical attention should be sought.  Contact my office immediately and or head to your nearest emergency room for evaluation and treatment.  This may require immediate surgical repair.\par \par \par Educational material courtesy of the [employer] with respect to inguinal and femoral hernias has been provided for the patient's review and all questions have been answered.\par \par Routine PST to be arranged.  Pre-op Medical and Cardiac clearances requested.  F/u with Dr Paulson and Dr Lorenzo.\par \par F/U with me post operatively.

## 2019-02-22 ENCOUNTER — OTHER (OUTPATIENT)
Age: 54
End: 2019-02-22

## 2019-04-19 ENCOUNTER — APPOINTMENT (OUTPATIENT)
Dept: FAMILY MEDICINE | Facility: CLINIC | Age: 54
End: 2019-04-19
Payer: COMMERCIAL

## 2019-04-19 VITALS
BODY MASS INDEX: 36.68 KG/M2 | TEMPERATURE: 98.3 F | SYSTOLIC BLOOD PRESSURE: 142 MMHG | WEIGHT: 242 LBS | OXYGEN SATURATION: 98 % | HEART RATE: 76 BPM | RESPIRATION RATE: 14 BRPM | HEIGHT: 68 IN | DIASTOLIC BLOOD PRESSURE: 88 MMHG

## 2019-04-19 DIAGNOSIS — H11.30 CONJUNCTIVAL HEMORRHAGE, UNSPECIFIED EYE: ICD-10-CM

## 2019-04-19 PROCEDURE — 99213 OFFICE O/P EST LOW 20 MIN: CPT

## 2019-04-19 RX ORDER — PROMETHAZINE HYDROCHLORIDE AND DEXTROMETHORPHAN HYDROBROMIDE ORAL SOLUTION 15; 6.25 MG/5ML; MG/5ML
6.25-15 SOLUTION ORAL
Qty: 240 | Refills: 0 | Status: DISCONTINUED | COMMUNITY
Start: 2018-06-15 | End: 2019-04-19

## 2019-04-19 RX ORDER — METOPROLOL TARTRATE 50 MG/1
50 TABLET, FILM COATED ORAL
Refills: 0 | Status: DISCONTINUED | COMMUNITY

## 2019-04-19 RX ORDER — CLOBETASOL PROPIONATE 0.5 MG/G
0.05 OINTMENT TOPICAL
Qty: 15 | Refills: 0 | Status: DISCONTINUED | COMMUNITY
Start: 2019-03-27

## 2019-04-19 RX ORDER — AMOXICILLIN 500 MG/1
500 CAPSULE ORAL
Qty: 12 | Refills: 0 | Status: DISCONTINUED | COMMUNITY
Start: 2018-09-17 | End: 2019-04-19

## 2019-04-19 RX ORDER — ASPIRIN 81 MG/1
81 TABLET, CHEWABLE ORAL DAILY
Qty: 90 | Refills: 0 | Status: DISCONTINUED | COMMUNITY
Start: 2017-07-25 | End: 2019-04-19

## 2019-04-19 NOTE — PHYSICAL EXAM
[Well Nourished] : well nourished [No Acute Distress] : no acute distress [Well-Appearing] : well-appearing [Well Developed] : well developed [PERRL] : pupils equal round and reactive to light [Normal Outer Ear/Nose] : the outer ears and nose were normal in appearance [EOMI] : extraocular movements intact [No JVD] : no jugular venous distention [Normal Oropharynx] : the oropharynx was normal [No Lymphadenopathy] : no lymphadenopathy [Supple] : supple [Thyroid Normal, No Nodules] : the thyroid was normal and there were no nodules present [Clear to Auscultation] : lungs were clear to auscultation bilaterally [No Respiratory Distress] : no respiratory distress  [Normal Rate] : normal rate  [No Accessory Muscle Use] : no accessory muscle use [No Murmur] : no murmur heard [Regular Rhythm] : with a regular rhythm [Normal S1, S2] : normal S1 and S2 [No Abdominal Bruit] : a ~M bruit was not heard ~T in the abdomen [No Carotid Bruits] : no carotid bruits [Pedal Pulses Present] : the pedal pulses are present [No Varicosities] : no varicosities [No Edema] : there was no peripheral edema [No Extremity Clubbing/Cyanosis] : no extremity clubbing/cyanosis [Soft] : abdomen soft [No Palpable Aorta] : no palpable aorta [Non-distended] : non-distended [Non Tender] : non-tender [Normal Bowel Sounds] : normal bowel sounds [No HSM] : no HSM [No Masses] : no abdominal mass palpated [Normal Posterior Cervical Nodes] : no posterior cervical lymphadenopathy [No CVA Tenderness] : no CVA  tenderness [Normal Anterior Cervical Nodes] : no anterior cervical lymphadenopathy [No Spinal Tenderness] : no spinal tenderness [No Joint Swelling] : no joint swelling [No Rash] : no rash [Grossly Normal Strength/Tone] : grossly normal strength/tone [Normal Gait] : normal gait [Coordination Grossly Intact] : coordination grossly intact [No Focal Deficits] : no focal deficits [Normal Affect] : the affect was normal [Deep Tendon Reflexes (DTR)] : deep tendon reflexes were 2+ and symmetric [Normal Insight/Judgement] : insight and judgment were intact [de-identified] : subconjunctival hemorrhage of medial aspect of right eye, pupil/globe intact, visual acuity preserved in right eye, no pain with EOM or pupillary constriction

## 2019-04-19 NOTE — PLAN
[FreeTextEntry1] : -may take up to 2-3 weeks to resolve completely, avoid straining \par -advised f/u with Ophtho if worsening or ER if develops changes in vision or pain \par

## 2019-04-19 NOTE — HISTORY OF PRESENT ILLNESS
[FreeTextEntry8] : Pt woke up with redness of inner corner of right eye. No pain, discharge, pruritus, changes in vision. No trauma to area. No heavy coughing recently. Admits he was getting worked up at daughter's sport event yesterday. Has been taking BP meds.

## 2019-05-20 ENCOUNTER — RX RENEWAL (OUTPATIENT)
Age: 54
End: 2019-05-20

## 2019-07-05 ENCOUNTER — RX RENEWAL (OUTPATIENT)
Age: 54
End: 2019-07-05

## 2019-08-02 ENCOUNTER — TRANSCRIPTION ENCOUNTER (OUTPATIENT)
Age: 54
End: 2019-08-02

## 2019-08-06 ENCOUNTER — APPOINTMENT (OUTPATIENT)
Dept: INTERNAL MEDICINE | Facility: CLINIC | Age: 54
End: 2019-08-06
Payer: COMMERCIAL

## 2019-08-06 VITALS — DIASTOLIC BLOOD PRESSURE: 84 MMHG | SYSTOLIC BLOOD PRESSURE: 150 MMHG

## 2019-08-06 VITALS
OXYGEN SATURATION: 98 % | BODY MASS INDEX: 35.43 KG/M2 | HEART RATE: 83 BPM | TEMPERATURE: 98.3 F | DIASTOLIC BLOOD PRESSURE: 100 MMHG | SYSTOLIC BLOOD PRESSURE: 150 MMHG | WEIGHT: 233 LBS | RESPIRATION RATE: 14 BRPM

## 2019-08-06 PROCEDURE — 99214 OFFICE O/P EST MOD 30 MIN: CPT

## 2019-08-06 NOTE — PHYSICAL EXAM
[Well Nourished] : well nourished [No Acute Distress] : no acute distress [Well Developed] : well developed [Well-Appearing] : well-appearing [Normal Sclera/Conjunctiva] : normal sclera/conjunctiva [PERRL] : pupils equal round and reactive to light [EOMI] : extraocular movements intact [Normal Oropharynx] : the oropharynx was normal [Normal Outer Ear/Nose] : the outer ears and nose were normal in appearance [No Lymphadenopathy] : no lymphadenopathy [No JVD] : no jugular venous distention [Thyroid Normal, No Nodules] : the thyroid was normal and there were no nodules present [Supple] : supple [No Respiratory Distress] : no respiratory distress  [No Accessory Muscle Use] : no accessory muscle use [Clear to Auscultation] : lungs were clear to auscultation bilaterally [Regular Rhythm] : with a regular rhythm [Normal Rate] : normal rate  [No Murmur] : no murmur heard [Normal S1, S2] : normal S1 and S2 [No Carotid Bruits] : no carotid bruits [No Abdominal Bruit] : a ~M bruit was not heard ~T in the abdomen [No Varicosities] : no varicosities [Pedal Pulses Present] : the pedal pulses are present [No Edema] : there was no peripheral edema [No Palpable Aorta] : no palpable aorta [No Extremity Clubbing/Cyanosis] : no extremity clubbing/cyanosis [Soft] : abdomen soft [Non Tender] : non-tender [No Masses] : no abdominal mass palpated [Non-distended] : non-distended [Normal Bowel Sounds] : normal bowel sounds [No HSM] : no HSM [Normal Posterior Cervical Nodes] : no posterior cervical lymphadenopathy [Normal Anterior Cervical Nodes] : no anterior cervical lymphadenopathy [No CVA Tenderness] : no CVA  tenderness [No Spinal Tenderness] : no spinal tenderness [Grossly Normal Strength/Tone] : grossly normal strength/tone [No Joint Swelling] : no joint swelling [No Rash] : no rash [No Focal Deficits] : no focal deficits [Coordination Grossly Intact] : coordination grossly intact [Normal Gait] : normal gait [Deep Tendon Reflexes (DTR)] : deep tendon reflexes were 2+ and symmetric [Normal Affect] : the affect was normal [Normal Insight/Judgement] : insight and judgment were intact

## 2019-08-06 NOTE — PLAN
[FreeTextEntry1] : Increase Metoprolol-ER to 100mg QD; continue other BP meds\par RTO 2 weelks to repeat BP\par Pt to make an appt for CPE with fasting labs

## 2019-08-10 NOTE — ED ADULT NURSE NOTE - NS ED NURSE DC INFO COMPLEXITY
As per mother pt fell onto garbage can and obtain laceration to right shin. received stitches here last week but c/o of leg pain and redness around the area. redness noted. as per mother pt finished antibiotics yesterday. NKDA. No PMH. radial pulse palpated. no fevers. Verbalized Understanding/Patient asked questions/Returned Demonstration

## 2019-09-06 ENCOUNTER — APPOINTMENT (OUTPATIENT)
Dept: FAMILY MEDICINE | Facility: CLINIC | Age: 54
End: 2019-09-06
Payer: COMMERCIAL

## 2019-09-06 VITALS
RESPIRATION RATE: 14 BRPM | HEART RATE: 59 BPM | SYSTOLIC BLOOD PRESSURE: 120 MMHG | WEIGHT: 233 LBS | OXYGEN SATURATION: 97 % | HEIGHT: 68 IN | BODY MASS INDEX: 35.31 KG/M2 | DIASTOLIC BLOOD PRESSURE: 70 MMHG | TEMPERATURE: 97.5 F

## 2019-09-06 PROCEDURE — 99396 PREV VISIT EST AGE 40-64: CPT | Mod: 25

## 2019-09-06 PROCEDURE — 36415 COLL VENOUS BLD VENIPUNCTURE: CPT

## 2019-09-06 NOTE — PLAN
[FreeTextEntry1] : HCM: \par -check labs \par -pt has upcoming appt with cardio this month, prefers to have EKG done there\par -UTD with colo \par \par HTN: \par -stable, continue with current meds

## 2019-09-06 NOTE — HISTORY OF PRESENT ILLNESS
[de-identified] : Pt here for CPE. Feeling well, no acute complaints. Metoprolol was increased last visit for elevated BP, pt tolerated well.

## 2019-09-08 ENCOUNTER — MOBILE ON CALL (OUTPATIENT)
Age: 54
End: 2019-09-08

## 2019-09-09 LAB
25(OH)D3 SERPL-MCNC: 38.6 NG/ML
ALBUMIN SERPL ELPH-MCNC: 4.5 G/DL
ALP BLD-CCNC: 91 U/L
ALT SERPL-CCNC: 36 U/L
ANION GAP SERPL CALC-SCNC: 15 MMOL/L
AST SERPL-CCNC: 29 U/L
BASOPHILS # BLD AUTO: 0.05 K/UL
BASOPHILS NFR BLD AUTO: 0.7 %
BILIRUB SERPL-MCNC: 0.5 MG/DL
BUN SERPL-MCNC: 15 MG/DL
CALCIUM SERPL-MCNC: 9.2 MG/DL
CHLORIDE SERPL-SCNC: 102 MMOL/L
CHOLEST SERPL-MCNC: 126 MG/DL
CHOLEST/HDLC SERPL: 3.1 RATIO
CO2 SERPL-SCNC: 23 MMOL/L
CREAT SERPL-MCNC: 0.88 MG/DL
CRP SERPL-MCNC: <0.1 MG/DL
EOSINOPHIL # BLD AUTO: 0.12 K/UL
EOSINOPHIL NFR BLD AUTO: 1.6 %
ESTIMATED AVERAGE GLUCOSE: 111 MG/DL
FOLATE SERPL-MCNC: 18.1 NG/ML
GLUCOSE SERPL-MCNC: 97 MG/DL
HBA1C MFR BLD HPLC: 5.5 %
HCT VFR BLD CALC: 46.8 %
HDLC SERPL-MCNC: 41 MG/DL
HGB BLD-MCNC: 15.8 G/DL
IMM GRANULOCYTES NFR BLD AUTO: 0.3 %
LDLC SERPL CALC-MCNC: 69 MG/DL
LYMPHOCYTES # BLD AUTO: 1.9 K/UL
LYMPHOCYTES NFR BLD AUTO: 25.8 %
MAN DIFF?: NORMAL
MCHC RBC-ENTMCNC: 30.1 PG
MCHC RBC-ENTMCNC: 33.8 GM/DL
MCV RBC AUTO: 89.1 FL
MONOCYTES # BLD AUTO: 0.67 K/UL
MONOCYTES NFR BLD AUTO: 9.1 %
NEUTROPHILS # BLD AUTO: 4.6 K/UL
NEUTROPHILS NFR BLD AUTO: 62.5 %
PLATELET # BLD AUTO: 241 K/UL
POTASSIUM SERPL-SCNC: 4.5 MMOL/L
PROT SERPL-MCNC: 7.6 G/DL
PSA SERPL-MCNC: 0.91 NG/ML
RBC # BLD: 5.25 M/UL
RBC # FLD: 12.9 %
SODIUM SERPL-SCNC: 140 MMOL/L
TRIGL SERPL-MCNC: 79 MG/DL
TSH SERPL-ACNC: 1.49 UIU/ML
VIT B12 SERPL-MCNC: 592 PG/ML
WBC # FLD AUTO: 7.36 K/UL

## 2019-09-19 ENCOUNTER — NON-APPOINTMENT (OUTPATIENT)
Age: 54
End: 2019-09-19

## 2019-09-19 ENCOUNTER — APPOINTMENT (OUTPATIENT)
Dept: CARDIOLOGY | Facility: CLINIC | Age: 54
End: 2019-09-19
Payer: COMMERCIAL

## 2019-09-19 VITALS
HEART RATE: 58 BPM | WEIGHT: 234 LBS | OXYGEN SATURATION: 94 % | HEIGHT: 69 IN | DIASTOLIC BLOOD PRESSURE: 96 MMHG | SYSTOLIC BLOOD PRESSURE: 156 MMHG | BODY MASS INDEX: 34.66 KG/M2

## 2019-09-19 PROCEDURE — 93000 ELECTROCARDIOGRAM COMPLETE: CPT

## 2019-09-19 PROCEDURE — 99214 OFFICE O/P EST MOD 30 MIN: CPT

## 2019-11-04 ENCOUNTER — APPOINTMENT (OUTPATIENT)
Dept: VASCULAR SURGERY | Facility: CLINIC | Age: 54
End: 2019-11-04
Payer: COMMERCIAL

## 2019-11-04 VITALS
SYSTOLIC BLOOD PRESSURE: 164 MMHG | HEART RATE: 72 BPM | HEIGHT: 68 IN | DIASTOLIC BLOOD PRESSURE: 96 MMHG | TEMPERATURE: 97.8 F | WEIGHT: 235 LBS | BODY MASS INDEX: 35.61 KG/M2

## 2019-11-04 PROCEDURE — 99213 OFFICE O/P EST LOW 20 MIN: CPT

## 2019-11-04 PROCEDURE — 93970 EXTREMITY STUDY: CPT

## 2019-11-04 NOTE — ASSESSMENT
[FreeTextEntry1] : A lower extremity venous duplex ultrasound was performed in the office today, which showed:\par -No evidence of DVT/SVT\par -Reflux in bilateral GSV and right calf tributaries\par \par In summary, Mr. Leary presents with bilateral lower extremity superficial venous insufficiency. We will schedule him for bilateral GSV RFA and right calf stab phlebectomy if his varicosities persist following GSV ablation.

## 2019-11-04 NOTE — HISTORY OF PRESENT ILLNESS
[FreeTextEntry1] : I have just had the pleasure of seeing Mr. Benjamin Leary ( [birth year]) in follow up for right lower extremity varicosities. Mr. Leary is a pleasant 53-year-old gentleman who initially presented two years ago for evaluation of a right medial calf lesion, present since he was in his 20s. A venous duplex demonstrated varicosities in the affected area, without evidence of AVM. Mr. Leary is now returning for treatment. \par \par He reports right leg edema and pain over the area. Symptoms worsen as the day progresses and are exacerbated by sitting and standing. He has been wearing compression stockings since his last visit. He denies any lower extremity wounds. He denies any history of claudication, rest pain, or tissue loss. \par \par His medical/surgical history is otherwise significant for: hypertension, hyperlipidemia, moderate pancreatitis (2/2 HCTZ), left THR 3/17, BPH, DJD, CVA ( left corona radiata)\par \par Medications: Aspirin, Amlodipine, Simvastatin, Metoprolol, Loratidine\par \par Allergies: NKDA\par \par Social history: Former pipe and cigar smoker (quit 6 years ago)\par \par FH: Father, mother- lung cancer\par

## 2019-11-04 NOTE — PHYSICAL EXAM
[Normal Breath Sounds] : Normal breath sounds [Normal Heart Sounds] : normal heart sounds [2+] : left 2+ [de-identified] : NAD. Neurologically intact. [de-identified] : WNL [FreeTextEntry1] : Varicosities of the medial right calf. Bilateral ankle edema.

## 2019-12-17 ENCOUNTER — OTHER (OUTPATIENT)
Age: 54
End: 2019-12-17

## 2019-12-17 DIAGNOSIS — I83.90 ASYMPTOMATIC VARICOSE VEINS OF UNSPECIFIED LOWER EXTREMITY: ICD-10-CM

## 2019-12-17 RX ORDER — LIDOCAINE HYDROCHLORIDE 10 MG/ML
1 INJECTION, SOLUTION INFILTRATION; PERINEURAL
Qty: 50 | Refills: 0 | Status: COMPLETED | COMMUNITY
Start: 2019-12-17 | End: 2019-12-18

## 2019-12-17 RX ORDER — SODIUM BICARBONATE 84 MG/ML
8.4 INJECTION, SOLUTION INTRAVENOUS
Qty: 5 | Refills: 0 | Status: COMPLETED | COMMUNITY
Start: 2019-12-17 | End: 2019-12-18

## 2019-12-17 RX ORDER — SODIUM CHLORIDE 9 G/ML
0.9 INJECTION, SOLUTION INTRAVENOUS
Qty: 500 | Refills: 0 | Status: COMPLETED | COMMUNITY
Start: 2019-12-17 | End: 2019-12-18

## 2019-12-18 ENCOUNTER — APPOINTMENT (OUTPATIENT)
Dept: VASCULAR SURGERY | Facility: CLINIC | Age: 54
End: 2019-12-18
Payer: COMMERCIAL

## 2019-12-18 PROCEDURE — 36475 ENDOVENOUS RF 1ST VEIN: CPT | Mod: RT

## 2019-12-18 RX ORDER — LIDOCAINE HYDROCHLORIDE 10 MG/ML
1 INJECTION, SOLUTION INFILTRATION; PERINEURAL
Qty: 1 | Refills: 0 | Status: COMPLETED | COMMUNITY
Start: 2019-12-17 | End: 2019-12-18

## 2019-12-18 RX ORDER — SODIUM CHLORIDE 9 G/ML
0.9 INJECTION, SOLUTION INTRAVENOUS
Qty: 1 | Refills: 0 | Status: COMPLETED | COMMUNITY
Start: 2019-12-17 | End: 2019-12-18

## 2019-12-18 RX ORDER — SODIUM BICARBONATE 84 MG/ML
8.4 INJECTION, SOLUTION INTRAVENOUS
Qty: 1 | Refills: 0 | Status: COMPLETED | COMMUNITY
Start: 2019-12-17 | End: 2019-12-18

## 2019-12-18 NOTE — PROCEDURE
[FreeTextEntry1] : right GSV RFA [FreeTextEntry3] : Procedural safety checklist and time out completed:\par Confirmed patient identification (Patient Name, , and/or medical record number including when possible affirmation by patient or parent/family/other).\par Confirmed procedure with the patient. Consent present, accurate and signed. \par Confirmed special equipment and supplies are present.\par Sterility confirmed. Position verified. \par Site/ side is marked and visible and confirmed. \par Procedure confirmed by consent. Accurate consent including side and site.\par Review of medical records, including venous ultrasound, noting correct procedure including site and side.\par MD/PA verifies presence and review of imaging studies and or written report of imaging studies.\par Agreement on the procedure to be performed\par Time out completed.\par All of the above has been confirmed by the team.\par All patient-specific concerns have been addressed. \par \par Indication: Right lower extremity varicose veins with leg pain, leg swelling, and leg cramping.  Venous insufficiency/ reflux.\par \par Procedure: radiofrequency ablation of the right great saphenous vein. \par 	\par Mr. LUCIO HAGAN is a 54 year old M with a history of  right lower extremity varicose veins previously seen in the office.  Ultrasound examination demonstrated venous insufficiency. A trial of compression stockings, exercise, elevation, and pain medication was attempted without relief and definitive treatment with radiofrequency ablation was offered. \par \par The patient has come for radiofrequency ablation treatment of the right great saphenous vein. \par I have discussed the risks of the procedure at length with the patient. The risks discussed were inclusive of but not limited to infection, irritation at the site of infiltration of local anesthesia, and also rare risk of deep venous thrombosis and pulmonary emboli. The patient agrees to proceed with the procedure. \par The patient was escorted into the procedure room and a time out called.\par The entire limb was prepped and draped in sterile fashion. The RF fiber was placed on the sterile field and connected by a sterile cable. Actuation, temperature, and impedance testing were performed to ensure that all components were connected and operating properly. \par The patient was placed on the procedure table and local anesthesia was instilled in the skin overlying the access site. Under ultrasound guidance, the vein was punctured with a micropuncture needle, using the anterior wall technique. A guide wire was now introduced through the needle, and the needle was then exchanged over the guide wire for a 7F sheath. The guide wire was removed and the RF probe was then placed into the right great saphenous vein through the sheath and position confirmed using ultrasound guidance. After the RF probe position was verified by ultrasound, tumescent anesthesia consisting of normal saline, 1% lidocaine with 8.4% sodium bicarbonate was infiltrated, under ultrasound guidance, precisely into the perivenous compartment along the entire length of the vein until a “halo” of fluid was noted around the vein. After RF probe position was again confirmed with ultrasound imaging, RF energy was applied. The probe was gradually and carefully withdrawn at a rate of 6.5cm/20seconds. \par \par The total volume injected was 500 cc. \par Total treatment time was _160 seconds.\par Treatment length was 46 cm and 8 cycles of RF performed using the  7 cm probe. \par The probe is 3.7 cm from the SFJ.\par \par Estimated Blood Loss: minimal\par Repeat ultrasound of the treated vein was performed confirming successful treatment. The catheter and sheath were withdrawn and hemostasis established with direct pressure. After assuring hemostasis, a sterile 4x4 was placed on the access site and an ACE compression wrap was applied. Patient tolerated procedure well. Patient was given post-procedure instructions and follow up appointment was scheduled.\par \par \par

## 2019-12-23 ENCOUNTER — APPOINTMENT (OUTPATIENT)
Dept: VASCULAR SURGERY | Facility: CLINIC | Age: 54
End: 2019-12-23
Payer: COMMERCIAL

## 2019-12-23 PROCEDURE — 93971 EXTREMITY STUDY: CPT

## 2020-01-01 NOTE — ED ADULT NURSE NOTE - CINV DISCH MEDS REVIEWED YN
Mother called LC for concern of infant choking during feedings at breast. Mother states infant chokes, coughs, unlatches and then cries loudly, at times taking a minute to catch her breath. Discussed her strong letdown and milk being in. This is her second baby. Discussed this is common for strong letdowns and happens often at the second letdown with her baby, and if baby is getting sleepy. Reviewed concerning symptoms to call MD or go to the ER for (blue spells, difficulty breathing, not recovering) and to call pediatrician if infant continues to have these episodes for concerns of swallowing or feeding coordination issues.  Infant always recovers to baseline per mother without color change.    Yes

## 2020-01-07 ENCOUNTER — OTHER (OUTPATIENT)
Age: 55
End: 2020-01-07

## 2020-01-14 ENCOUNTER — APPOINTMENT (OUTPATIENT)
Dept: INTERNAL MEDICINE | Facility: CLINIC | Age: 55
End: 2020-01-14
Payer: COMMERCIAL

## 2020-01-14 VITALS
TEMPERATURE: 97.8 F | WEIGHT: 236 LBS | HEIGHT: 68 IN | OXYGEN SATURATION: 98 % | BODY MASS INDEX: 35.77 KG/M2 | HEART RATE: 74 BPM | SYSTOLIC BLOOD PRESSURE: 158 MMHG | DIASTOLIC BLOOD PRESSURE: 100 MMHG | RESPIRATION RATE: 14 BRPM

## 2020-01-14 PROCEDURE — 99213 OFFICE O/P EST LOW 20 MIN: CPT

## 2020-01-14 NOTE — PLAN
[FreeTextEntry1] : -advised take losartan in morning and take metoprolol in evening \par -tingling in leg is likely nerve inflammation/entrapment from tight pants or manipulation for recent ablation, advised to take OTC antiinflammatory such as ibuprofen or advil twice a day with food for a few days, avoid wearing pants with tight waistbands or belts, f/u with neuro for EMG \par

## 2020-01-14 NOTE — PHYSICAL EXAM
[No Acute Distress] : no acute distress [Well Nourished] : well nourished [Well-Appearing] : well-appearing [Well Developed] : well developed [EOMI] : extraocular movements intact [PERRL] : pupils equal round and reactive to light [Normal Sclera/Conjunctiva] : normal sclera/conjunctiva [Normal Outer Ear/Nose] : the outer ears and nose were normal in appearance [Normal Oropharynx] : the oropharynx was normal [No JVD] : no jugular venous distention [Supple] : supple [No Lymphadenopathy] : no lymphadenopathy [Thyroid Normal, No Nodules] : the thyroid was normal and there were no nodules present [No Respiratory Distress] : no respiratory distress  [No Accessory Muscle Use] : no accessory muscle use [Clear to Auscultation] : lungs were clear to auscultation bilaterally [Normal Rate] : normal rate  [Regular Rhythm] : with a regular rhythm [Normal S1, S2] : normal S1 and S2 [No Murmur] : no murmur heard [No Carotid Bruits] : no carotid bruits [No Abdominal Bruit] : a ~M bruit was not heard ~T in the abdomen [No Varicosities] : no varicosities [Pedal Pulses Present] : the pedal pulses are present [No Edema] : there was no peripheral edema [No Palpable Aorta] : no palpable aorta [No Extremity Clubbing/Cyanosis] : no extremity clubbing/cyanosis [Soft] : abdomen soft [Non Tender] : non-tender [Non-distended] : non-distended [No Masses] : no abdominal mass palpated [No HSM] : no HSM [Normal Bowel Sounds] : normal bowel sounds [Normal Posterior Cervical Nodes] : no posterior cervical lymphadenopathy [Normal Anterior Cervical Nodes] : no anterior cervical lymphadenopathy [No CVA Tenderness] : no CVA  tenderness [No Spinal Tenderness] : no spinal tenderness [No Joint Swelling] : no joint swelling [Grossly Normal Strength/Tone] : grossly normal strength/tone [No Focal Deficits] : no focal deficits [Coordination Grossly Intact] : coordination grossly intact [No Rash] : no rash [Deep Tendon Reflexes (DTR)] : deep tendon reflexes were 2+ and symmetric [Normal Affect] : the affect was normal [Normal Gait] : normal gait [Normal Insight/Judgement] : insight and judgment were intact

## 2020-01-14 NOTE — HISTORY OF PRESENT ILLNESS
[de-identified] : Pt here for f/u. Pt reports that increased dose of metoprolol makes him tired, would like to take the medication in the evening. Also getting intermittent tingling in the right thigh. It gets better when he massages the area. He recently had a vein ablation in that leg. He made an appt with his neurologist for this complaint. His BP in the office is elevated. [facility] and it is always in 130/80s.

## 2020-01-15 ENCOUNTER — APPOINTMENT (OUTPATIENT)
Dept: VASCULAR SURGERY | Facility: CLINIC | Age: 55
End: 2020-01-15
Payer: COMMERCIAL

## 2020-01-15 DIAGNOSIS — I83.892 VARICOSE VEINS OF LEFT LOWER EXTREMITY WITH OTHER COMPLICATIONS: ICD-10-CM

## 2020-01-15 PROCEDURE — 36475 ENDOVENOUS RF 1ST VEIN: CPT | Mod: LT

## 2020-01-15 RX ORDER — SODIUM CHLORIDE 9 G/ML
0.9 INJECTION, SOLUTION INTRAVENOUS
Qty: 1 | Refills: 0 | Status: COMPLETED | COMMUNITY
Start: 2020-01-07 | End: 2020-01-15

## 2020-01-15 RX ORDER — SODIUM BICARBONATE 84 MG/ML
8.4 INJECTION, SOLUTION INTRAVENOUS
Qty: 1 | Refills: 0 | Status: COMPLETED | COMMUNITY
Start: 2020-01-07 | End: 2020-01-15

## 2020-01-15 RX ORDER — LIDOCAINE HYDROCHLORIDE 10 MG/ML
1 INJECTION, SOLUTION INFILTRATION; PERINEURAL
Qty: 1 | Refills: 0 | Status: COMPLETED | COMMUNITY
Start: 2020-01-07 | End: 2020-01-15

## 2020-01-15 NOTE — PROCEDURE
[FreeTextEntry1] : left GSV RFA [FreeTextEntry3] : Procedural safety checklist and time out completed:\par Confirmed patient identification (Patient Name, , and/or medical record number including when possible affirmation by patient or parent/family/other).\par Confirmed procedure with the patient. Consent present, accurate and signed. \par Confirmed special equipment and supplies are present.\par Sterility confirmed. Position verified. \par Site/ side is marked and visible and confirmed. \par Procedure confirmed by consent. Accurate consent including side and site.\par Review of medical records, including venous ultrasound, noting correct procedure including site and side.\par MD/PA verifies presence and review of imaging studies and or written report of imaging studies.\par Agreement on the procedure to be performed\par Time out completed.\par All of the above has been confirmed by the team.\par All patient-specific concerns have been addressed. \par \par Indication: _Left lower extremity varicose veins with leg pain, leg swelling, and leg cramping.  Venous insufficiency/ reflux.\par \par Procedure: radiofrequency ablation of the left great saphenous vein. \par 	\par Mr. LUCIO HAGAN is a 54 year old M with a history of  left lower extremity varicose veins previously seen in the office.  Ultrasound examination demonstrated venous insufficiency. A trial of compression stockings, exercise, elevation, and pain medication was attempted without relief and definitive treatment with radiofrequency ablation was offered. \par \par The patient has come for radiofrequency ablation treatment of the left great saphenous vein. \par I have discussed the risks of the procedure at length with the patient. The risks discussed were inclusive of but not limited to infection, irritation at the site of infiltration of local anesthesia, and also rare risk of deep venous thrombosis and pulmonary emboli. The patient agrees to proceed with the procedure. \par The patient was escorted into the procedure room and a time out called.\par The entire limb was prepped and draped in sterile fashion. The RF fiber was placed on the sterile field and connected by a sterile cable. Actuation, temperature, and impedance testing were performed to ensure that all components were connected and operating properly. \par The patient was placed on the procedure table and local anesthesia was instilled in the skin overlying the access site. Under ultrasound guidance, the vein was punctured with a micropuncture needle, using the anterior wall technique. A guide wire was now introduced through the needle, and the needle was then exchanged over the guide wire for a 7F sheath. The guide wire was removed and the RF probe was then placed into the left great saphenous vein through the sheath and position confirmed using ultrasound guidance. After the RF probe position was verified by ultrasound, tumescent anesthesia consisting of normal saline, 1% lidocaine with 8.4% sodium bicarbonate was infiltrated, under ultrasound guidance, precisely into the perivenous compartment along the entire length of the vein until a “halo” of fluid was noted around the vein. After RF probe position was again confirmed with ultrasound imaging, RF energy was applied. The probe was gradually and carefully withdrawn at a rate of 6.5cm/20seconds. \par \par The total volume injected was 400 cc. \par Total treatment time was 100 seconds.\par Treatment length was 3.6 cm and 5 cycles of RF performed using the 7 cm probe. \par The probe is _3.6_cm from the SFJ.\par \par Estimated Blood Loss: minimal\par Repeat ultrasound of the treated vein was performed confirming successful treatment. The catheter and sheath were withdrawn and hemostasis established with direct pressure. After assuring hemostasis, a sterile 4x4 was placed on the access site and an ACE compression wrap was applied. Patient tolerated procedure well. Patient was given post-procedure instructions and follow up appointment was scheduled.\par \par \par

## 2020-01-21 ENCOUNTER — APPOINTMENT (OUTPATIENT)
Dept: VASCULAR SURGERY | Facility: CLINIC | Age: 55
End: 2020-01-21
Payer: COMMERCIAL

## 2020-01-21 PROCEDURE — 93971 EXTREMITY STUDY: CPT

## 2020-01-31 DIAGNOSIS — R60.9 EDEMA, UNSPECIFIED: ICD-10-CM

## 2020-02-02 ENCOUNTER — RX RENEWAL (OUTPATIENT)
Age: 55
End: 2020-02-02

## 2020-02-06 ENCOUNTER — APPOINTMENT (OUTPATIENT)
Dept: VASCULAR SURGERY | Facility: CLINIC | Age: 55
End: 2020-02-06
Payer: COMMERCIAL

## 2020-02-06 DIAGNOSIS — I83.891 VARICOSE VEINS OF RIGHT LOWER EXTREMITY WITH OTHER COMPLICATIONS: ICD-10-CM

## 2020-02-06 PROCEDURE — 37765 STAB PHLEB VEINS XTR 10-20: CPT | Mod: RT

## 2020-02-06 RX ORDER — LIDOCAINE HYDROCHLORIDE 10 MG/ML
1 INJECTION, SOLUTION INFILTRATION; PERINEURAL
Qty: 1 | Refills: 0 | Status: COMPLETED | COMMUNITY
Start: 2020-01-31 | End: 2020-02-06

## 2020-02-06 RX ORDER — SODIUM BICARBONATE 84 MG/ML
8.4 INJECTION, SOLUTION INTRAVENOUS
Qty: 1 | Refills: 0 | Status: COMPLETED | COMMUNITY
Start: 2020-01-31 | End: 2020-02-06

## 2020-02-06 RX ORDER — SODIUM CHLORIDE 9 G/ML
0.9 INJECTION, SOLUTION INTRAVENOUS
Qty: 1 | Refills: 0 | Status: COMPLETED | COMMUNITY
Start: 2020-01-31 | End: 2020-02-06

## 2020-02-06 NOTE — PROCEDURE
[FreeTextEntry1] : right stab phlebectomy [FreeTextEntry3] : Procedural safety checklist and time out completed:\par Confirmed patient identification (Patient Name, , and/or medical record number including when possible affirmation by patient or parent/family/other.\par Confirmed procedure with the patient. Consent present, accurate and signed. \par Confirmed special equipment and supplies are present.\par Sterility confirmed. Position verified. \par Site/ side is marked and visible and confirmed. \par Procedure confirmed by consent. Accurate consent including side and site.\par Review of medical records noting correct procedure including site and side.\par MD/PA verifies presence and review of imaging studies and or written report of imaging studies.\par Specify equipment are available for the planned procedure.\par MD/PA has marked the patient's procedural site and side.\par Agreement on the procedure to be performed\par Time out completed.\par All of the above has been confirmed by the team.\par All patient-specific concerns have been addressed. \par \par Indication: Right lower extremity varicose veins with inflammation, leg pain, leg swelling, and leg cramping.  \par \par Procedure: Stab phlebectomy right lower extremity\par \par  Mr. LUCIO HAGAN is a 54 year old M with a history of symptomatic right lower extremity varicose veins. A trial of compression stockings, exercise, elevation, and pain medication was attempted without relief and definitive treatment with microphlebectomy was offered. \par \par I have discussed the risks of the procedure at length with the patient. The risks discussed were inclusive of but not limited to infection, irritation at the site of infiltration of local anesthesia, and also rare risk of deep venous thrombosis and pulmonary emboli. The patient agrees to proceed with the procedure. \par The patient was escorted into the procedure room, the varicose veins for treatment were marked out and the patient placed on the examination table. The entire limb was prepped and draped in sterile fashion and a  time out was called. \par \par Local anesthesia using 10 cc 1% lidocaine was infiltrated using a 25 gauge needle over the previously marked prominent varicose vein sites.\par Multiple small stab incisions, each less than 1 cm in length was made at the noted sites. With the help of a vein hook, the vein was fished out at each of these sites, rolled over a narrow-tipped mosquito clamp and removed. Hemostasis was secured with leg elevation and application of manual pressure. After assuring hemostasis, a sterile 4x4 was placed on the access sites and an ACE compression wrap was applied. Estimated blood loss: minimal. Patient tolerated procedure well. Patient was given post-procedure instructions and follow up appointment was scheduled. \par \par SIDE - RIGHT \par SITE - CALF \par LOCATION - MEDIAL	\par Total Stab Incisions 10-20 \par \par

## 2020-03-05 ENCOUNTER — APPOINTMENT (OUTPATIENT)
Dept: VASCULAR SURGERY | Facility: CLINIC | Age: 55
End: 2020-03-05
Payer: COMMERCIAL

## 2020-03-05 VITALS
HEART RATE: 64 BPM | SYSTOLIC BLOOD PRESSURE: 157 MMHG | HEIGHT: 68 IN | WEIGHT: 236 LBS | BODY MASS INDEX: 35.77 KG/M2 | TEMPERATURE: 98 F | DIASTOLIC BLOOD PRESSURE: 95 MMHG

## 2020-03-05 PROCEDURE — 93971 EXTREMITY STUDY: CPT

## 2020-03-05 PROCEDURE — 99213 OFFICE O/P EST LOW 20 MIN: CPT

## 2020-03-05 NOTE — HISTORY OF PRESENT ILLNESS
[FreeTextEntry1] : I have just had the pleasure of seeing Mr. Benjamin Leary ( [birth year]) in follow up s/p right GSV RFA (19), left GSV RFA (1/15/20) and right leg stab phlebectomy (20). His post-operative ultrasounds demonstrated that the respective veins were closed and there was no evidence of DVT.\par \par Since the last procedure, Mr. Leary complains of some induration at the right medial leg stab site. He reports that he initially experienced some oozing from this area and it now feels like a hematoma. He reports minimal bilateral ankle edema, but has not been wearing his compression stockings due to a rash.\par \par His medical/surgical history is otherwise significant for: hypertension, hyperlipidemia, moderate pancreatitis (2/2 HCTZ), left THR 3/17, BPH, DJD, CVA ( left corona radiata)\par \par Medications: Aspirin, Amlodipine, Simvastatin, Metoprolol, Loratidine\par \par Allergies: NKDA\par \par Social history: Former pipe and cigar smoker (quit 6 years ago)\par \par FH: Father, mother- lung cancer\par

## 2020-03-05 NOTE — PHYSICAL EXAM
[Normal Breath Sounds] : Normal breath sounds [Normal Heart Sounds] : normal heart sounds [2+] : left 2+ [de-identified] : NAD. Neurologically intact. [de-identified] : WNL [FreeTextEntry1] : Medial right leg stab phlebectomy site is healed with underlying cord.

## 2020-03-05 NOTE — ASSESSMENT
[FreeTextEntry1] : A right leg venous duplex was performed in the office today, which showed no evidence of DVT. SVT in tributary in the calf.\par I instructed Mr. Leary to continue to wear compression stockings and to take NSAIDS as needed for pain.

## 2020-04-25 ENCOUNTER — MESSAGE (OUTPATIENT)
Age: 55
End: 2020-04-25

## 2020-05-02 ENCOUNTER — APPOINTMENT (OUTPATIENT)
Dept: DISASTER EMERGENCY | Facility: CLINIC | Age: 55
End: 2020-05-02

## 2020-05-04 LAB
SARS-COV-2 IGG SERPL IA-ACNC: <0.1 INDEX
SARS-COV-2 IGG SERPL QL IA: NEGATIVE

## 2020-05-20 ENCOUNTER — EMERGENCY (EMERGENCY)
Facility: HOSPITAL | Age: 55
LOS: 1 days | Discharge: HOME | End: 2020-05-20
Attending: EMERGENCY MEDICINE
Payer: COMMERCIAL

## 2020-05-20 VITALS
DIASTOLIC BLOOD PRESSURE: 99 MMHG | HEART RATE: 74 BPM | RESPIRATION RATE: 20 BRPM | SYSTOLIC BLOOD PRESSURE: 159 MMHG | OXYGEN SATURATION: 99 %

## 2020-05-20 VITALS
RESPIRATION RATE: 22 BRPM | HEART RATE: 70 BPM | TEMPERATURE: 98 F | SYSTOLIC BLOOD PRESSURE: 175 MMHG | HEIGHT: 68 IN | DIASTOLIC BLOOD PRESSURE: 94 MMHG | OXYGEN SATURATION: 99 % | WEIGHT: 229.94 LBS

## 2020-05-20 DIAGNOSIS — Z98.890 OTHER SPECIFIED POSTPROCEDURAL STATES: Chronic | ICD-10-CM

## 2020-05-20 DIAGNOSIS — Z96.642 PRESENCE OF LEFT ARTIFICIAL HIP JOINT: Chronic | ICD-10-CM

## 2020-05-20 LAB
ALBUMIN SERPL ELPH-MCNC: 4.6 G/DL — SIGNIFICANT CHANGE UP (ref 3.3–5)
ALP SERPL-CCNC: 100 U/L — SIGNIFICANT CHANGE UP (ref 40–120)
ALT FLD-CCNC: 27 U/L — SIGNIFICANT CHANGE UP (ref 10–45)
ANION GAP SERPL CALC-SCNC: 13 MMOL/L — SIGNIFICANT CHANGE UP (ref 5–17)
APPEARANCE UR: CLEAR — SIGNIFICANT CHANGE UP
APTT BLD: 28.4 SEC — SIGNIFICANT CHANGE UP (ref 27.5–36.3)
AST SERPL-CCNC: 24 U/L — SIGNIFICANT CHANGE UP (ref 10–40)
BASOPHILS # BLD AUTO: 0.05 K/UL — SIGNIFICANT CHANGE UP (ref 0–0.2)
BASOPHILS NFR BLD AUTO: 0.4 % — SIGNIFICANT CHANGE UP (ref 0–2)
BILIRUB SERPL-MCNC: 0.5 MG/DL — SIGNIFICANT CHANGE UP (ref 0.2–1.2)
BILIRUB UR-MCNC: NEGATIVE — SIGNIFICANT CHANGE UP
BUN SERPL-MCNC: 18 MG/DL — SIGNIFICANT CHANGE UP (ref 7–23)
CALCIUM SERPL-MCNC: 9.5 MG/DL — SIGNIFICANT CHANGE UP (ref 8.4–10.5)
CHLORIDE SERPL-SCNC: 99 MMOL/L — SIGNIFICANT CHANGE UP (ref 96–108)
CO2 SERPL-SCNC: 26 MMOL/L — SIGNIFICANT CHANGE UP (ref 22–31)
COLOR SPEC: COLORLESS — SIGNIFICANT CHANGE UP
CREAT SERPL-MCNC: 1.03 MG/DL — SIGNIFICANT CHANGE UP (ref 0.5–1.3)
DIFF PNL FLD: ABNORMAL
EOSINOPHIL # BLD AUTO: 0.11 K/UL — SIGNIFICANT CHANGE UP (ref 0–0.5)
EOSINOPHIL NFR BLD AUTO: 1 % — SIGNIFICANT CHANGE UP (ref 0–6)
GAS PNL BLDV: SIGNIFICANT CHANGE UP
GLUCOSE SERPL-MCNC: 151 MG/DL — HIGH (ref 70–99)
GLUCOSE UR QL: NEGATIVE — SIGNIFICANT CHANGE UP
HCT VFR BLD CALC: 50.3 % — HIGH (ref 39–50)
HGB BLD-MCNC: 16.7 G/DL — SIGNIFICANT CHANGE UP (ref 13–17)
IMM GRANULOCYTES NFR BLD AUTO: 0.3 % — SIGNIFICANT CHANGE UP (ref 0–1.5)
INR BLD: 1.01 RATIO — SIGNIFICANT CHANGE UP (ref 0.88–1.16)
KETONES UR-MCNC: NEGATIVE — SIGNIFICANT CHANGE UP
LEUKOCYTE ESTERASE UR-ACNC: NEGATIVE — SIGNIFICANT CHANGE UP
LYMPHOCYTES # BLD AUTO: 1.83 K/UL — SIGNIFICANT CHANGE UP (ref 1–3.3)
LYMPHOCYTES # BLD AUTO: 16.3 % — SIGNIFICANT CHANGE UP (ref 13–44)
MAGNESIUM SERPL-MCNC: 1.8 MG/DL — SIGNIFICANT CHANGE UP (ref 1.6–2.6)
MCHC RBC-ENTMCNC: 29.3 PG — SIGNIFICANT CHANGE UP (ref 27–34)
MCHC RBC-ENTMCNC: 33.2 GM/DL — SIGNIFICANT CHANGE UP (ref 32–36)
MCV RBC AUTO: 88.2 FL — SIGNIFICANT CHANGE UP (ref 80–100)
MONOCYTES # BLD AUTO: 0.83 K/UL — SIGNIFICANT CHANGE UP (ref 0–0.9)
MONOCYTES NFR BLD AUTO: 7.4 % — SIGNIFICANT CHANGE UP (ref 2–14)
NEUTROPHILS # BLD AUTO: 8.38 K/UL — HIGH (ref 1.8–7.4)
NEUTROPHILS NFR BLD AUTO: 74.6 % — SIGNIFICANT CHANGE UP (ref 43–77)
NITRITE UR-MCNC: NEGATIVE — SIGNIFICANT CHANGE UP
NRBC # BLD: 0 /100 WBCS — SIGNIFICANT CHANGE UP (ref 0–0)
PH UR: 7 — SIGNIFICANT CHANGE UP (ref 5–8)
PHOSPHATE SERPL-MCNC: 2.2 MG/DL — LOW (ref 2.5–4.5)
PLATELET # BLD AUTO: 236 K/UL — SIGNIFICANT CHANGE UP (ref 150–400)
POTASSIUM SERPL-MCNC: 3.5 MMOL/L — SIGNIFICANT CHANGE UP (ref 3.5–5.3)
POTASSIUM SERPL-SCNC: 3.5 MMOL/L — SIGNIFICANT CHANGE UP (ref 3.5–5.3)
PROT SERPL-MCNC: 8.3 G/DL — SIGNIFICANT CHANGE UP (ref 6–8.3)
PROT UR-MCNC: SIGNIFICANT CHANGE UP
PROTHROM AB SERPL-ACNC: 11.5 SEC — SIGNIFICANT CHANGE UP (ref 10–12.9)
RBC # BLD: 5.7 M/UL — SIGNIFICANT CHANGE UP (ref 4.2–5.8)
RBC # FLD: 13 % — SIGNIFICANT CHANGE UP (ref 10.3–14.5)
SODIUM SERPL-SCNC: 138 MMOL/L — SIGNIFICANT CHANGE UP (ref 135–145)
SP GR SPEC: 1.01 — SIGNIFICANT CHANGE UP (ref 1.01–1.02)
UROBILINOGEN FLD QL: NEGATIVE — SIGNIFICANT CHANGE UP
WBC # BLD: 11.23 K/UL — HIGH (ref 3.8–10.5)
WBC # FLD AUTO: 11.23 K/UL — HIGH (ref 3.8–10.5)

## 2020-05-20 PROCEDURE — 85610 PROTHROMBIN TIME: CPT

## 2020-05-20 PROCEDURE — 85027 COMPLETE CBC AUTOMATED: CPT

## 2020-05-20 PROCEDURE — 84295 ASSAY OF SERUM SODIUM: CPT

## 2020-05-20 PROCEDURE — 93005 ELECTROCARDIOGRAM TRACING: CPT

## 2020-05-20 PROCEDURE — 96375 TX/PRO/DX INJ NEW DRUG ADDON: CPT

## 2020-05-20 PROCEDURE — 74177 CT ABD & PELVIS W/CONTRAST: CPT

## 2020-05-20 PROCEDURE — 80053 COMPREHEN METABOLIC PANEL: CPT

## 2020-05-20 PROCEDURE — 84132 ASSAY OF SERUM POTASSIUM: CPT

## 2020-05-20 PROCEDURE — 82803 BLOOD GASES ANY COMBINATION: CPT

## 2020-05-20 PROCEDURE — 85730 THROMBOPLASTIN TIME PARTIAL: CPT

## 2020-05-20 PROCEDURE — 83735 ASSAY OF MAGNESIUM: CPT

## 2020-05-20 PROCEDURE — 82435 ASSAY OF BLOOD CHLORIDE: CPT

## 2020-05-20 PROCEDURE — 83605 ASSAY OF LACTIC ACID: CPT

## 2020-05-20 PROCEDURE — 93010 ELECTROCARDIOGRAM REPORT: CPT

## 2020-05-20 PROCEDURE — 82947 ASSAY GLUCOSE BLOOD QUANT: CPT

## 2020-05-20 PROCEDURE — 74177 CT ABD & PELVIS W/CONTRAST: CPT | Mod: 26

## 2020-05-20 PROCEDURE — 99284 EMERGENCY DEPT VISIT MOD MDM: CPT | Mod: 25

## 2020-05-20 PROCEDURE — 81001 URINALYSIS AUTO W/SCOPE: CPT

## 2020-05-20 PROCEDURE — 96374 THER/PROPH/DIAG INJ IV PUSH: CPT | Mod: XU

## 2020-05-20 PROCEDURE — 82330 ASSAY OF CALCIUM: CPT

## 2020-05-20 PROCEDURE — 99285 EMERGENCY DEPT VISIT HI MDM: CPT

## 2020-05-20 PROCEDURE — 84100 ASSAY OF PHOSPHORUS: CPT

## 2020-05-20 PROCEDURE — 85014 HEMATOCRIT: CPT

## 2020-05-20 RX ORDER — OXYCODONE HYDROCHLORIDE 5 MG/1
1 TABLET ORAL
Qty: 12 | Refills: 0
Start: 2020-05-20 | End: 2020-05-22

## 2020-05-20 RX ORDER — TAMSULOSIN HYDROCHLORIDE 0.4 MG/1
1 CAPSULE ORAL
Qty: 10 | Refills: 0
Start: 2020-05-20 | End: 2020-05-29

## 2020-05-20 RX ORDER — KETOROLAC TROMETHAMINE 30 MG/ML
15 SYRINGE (ML) INJECTION ONCE
Refills: 0 | Status: DISCONTINUED | OUTPATIENT
Start: 2020-05-20 | End: 2020-05-20

## 2020-05-20 RX ORDER — MORPHINE SULFATE 50 MG/1
4 CAPSULE, EXTENDED RELEASE ORAL ONCE
Refills: 0 | Status: DISCONTINUED | OUTPATIENT
Start: 2020-05-20 | End: 2020-05-20

## 2020-05-20 RX ADMIN — MORPHINE SULFATE 4 MILLIGRAM(S): 50 CAPSULE, EXTENDED RELEASE ORAL at 06:36

## 2020-05-20 RX ADMIN — Medication 15 MILLIGRAM(S): at 07:15

## 2020-05-20 NOTE — ED PROVIDER NOTE - PROGRESS NOTE DETAILS
Received signout from Dr. Frazier. Pt w/ RLQ pain, pending CT a/p this am. Received 4mg morphine with persistent pain. Ordered for toradol by Dr. Horne. Will continue to monitor   Lina Patel PA-C Pt feeling significant improvement after toradol. On CT found to have 2mm right uvj stone. Will d/c w/ flomax and oxy and encourage tylenol and motrin over oxy use. will provide uro follow up at d/c. Dr. Root aware and agrees with plan  Lina Patel PA-C

## 2020-05-20 NOTE — ED PROVIDER NOTE - CARE PROVIDER_API CALL
Jaime Beard  UROLOGY  [address] [place] [id number]  Phone: [phone]  Fax: [phone]  Follow Up Time:

## 2020-05-20 NOTE — ED PROVIDER NOTE - PHYSICAL EXAMINATION
GENERAL: uncomfortable appearing from pain.   HEAD:  Atraumatic, Normocephalic  EYES: PERRLA, conjunctiva and sclera clear  ENT: MMM; oropharynx clear  NECK: Supple, No JVD  CHEST/LUNG: Clear to auscultation bilaterally; No wheeze  HEART: Regular rate and rhythm; No murmurs, rubs, or gallops  ABDOMEN: Soft, +RLQ tenderness with voluntary guarding. Palpable R inguinal hernia; no signs of strangulation. Nondistended; Bowel sounds present  : +L CVA tenderness. No testicular swelling; normal lie. Chaperone: Dr. Horne   EXTREMITIES:  2+ Peripheral Pulses, No clubbing, cyanosis, or edema  PSYCH: AAOx3  NEUROLOGY: no focal motor or sensory deficits. 5/5 muscle strength in all extremities.   SKIN: No rashes or lesions

## 2020-05-20 NOTE — ED ADULT NURSE REASSESSMENT NOTE - NS ED NURSE REASSESS COMMENT FT1
Report taken from LEILANI Pedraza. Patient had just received 4mg of morphine 30 min prior which had no effect and was in 10/10 pain in right lower quadrant with radiation to testicles and lower back. MD Horne made aware, give 15mg Toradol, see emar. Upon assessment, patient is AOx4, afebrile, lung sounds clear upon ausculation, abdomen round/soft/extremely tender to touch in RLQ/+bowel sounds in all 4 quadrants, skin intact, heart sounds S1/S2 present, +pulses, and denies n/v/d/f/chills/CP/SOB/cough. Patient states he feels constipated and has been straining to have a BM the last week with his last BM this morning at 4am. Patient is awaiting CT of abdomen to r/o appendicitis vs hernia. All safety precautions maintained, call bell at bedside and will continue to monitor. Report taken from LEILANI Pedraza. Patient had just received 4mg of morphine 30 min prior which had no effect and was in 10/10 pain in right lower quadrant with radiation to testicles and lower back. MD Horne made aware, give 15mg Toradol, see emar. Upon assessment, patient is AOx4, afebrile, lung sounds clear upon ausculation, abdomen round/soft/extremely tender to touch in RLQ/+bowel sounds in all 4 quadrants, skin intact, heart sounds S1/S2 present, +pulses, heplock patent/intact, and denies n/v/d/f/chills/CP/SOB/cough. Patient states he feels constipated and has been straining to have a BM the last week with his last BM this morning at 4am. Patient is awaiting CT of abdomen to r/o appendicitis vs hernia. All safety precautions maintained, call bell at bedside and will continue to monitor. Report taken from LEILANI Pedraza. Patient had just received 4mg of morphine 30 min prior which had no effect and was in 10/10 pain in right lower quadrant with radiation to testicles and lower back. MD Horne made aware, give 15mg Toradol, see emar. Upon assessment, patient is AOx4, afebrile, lung sounds clear upon ausculation, abdomen round/soft/extremely tender to touch in RLQ/+bowel sounds in all 4 quadrants, skin intact, heart sounds S1/S2 present, +pulses, heplock patent/intact, and denies n/v/d/f/chills/CP/SOB/cough. Patient states he feels constipated and has been straining to have a BM the last week with his last BM this morning at 4am. Patient is awaiting CT of abdomen. All safety precautions maintained, call bell at bedside and will continue to monitor.

## 2020-05-20 NOTE — ED PROVIDER NOTE - ATTENDING CONTRIBUTION TO CARE
Afebrile. Awake and Alert. Abdomen soft, +TTP RLQ. ND. CN II-XII grossly intact. Moves all extremities without lateralization. No testicular swelling or TTP, no non-reducible right inguinal hernia.    r/o inguinal hernia vs right ureterolithiasis vs appendicitis

## 2020-05-20 NOTE — ED PROVIDER NOTE - NSFOLLOWUPINSTRUCTIONS_ED_ALL_ED_FT
1. STAY HYDRATED and Strain Urine.   2. Follow up with your Primary Care Physician within the next 2-3 days, call tomorrow to discuss follow up  3. You should follow up with Urology within 1 week of discharge call [phone] to make an appointment.   4. Continue your current medication regimen. Additionally start Flomax 1 tab each night before bed until stone passes.   5. For pain take Tylenol 1g every 6 hours alternated with Motrin 600mg every 6 hours. For severe pain unrelieved with Tylenol or Motrin take Oxycodone 5mg every 6 hours as needed for pain. Do not drive or consume alcohol while taking.   6. Return to ED for change of symptoms including increased pain unrelieved with medications, severe nausea or vomiting, fevers, chills, urinary symptoms, weakness and any other concerns

## 2020-05-20 NOTE — ED PROVIDER NOTE - CLINICAL SUMMARY MEDICAL DECISION MAKING FREE TEXT BOX
55 y/o M hx of HTN, HLD, CVA, JUAN PABLO, inguinal hernia, p/w sudden onset of RLQ this AM.  DDx includes incarcerated hernia, kidney stone, appendicitis. No signs of torsion on exam.   Plan for analgesia, screening labs, CT a/p and frequent reassessment.

## 2020-05-20 NOTE — ED ADULT TRIAGE NOTE - CHIEF COMPLAINT QUOTE
right lower abdominal pain since 4am. hx hernia right lower abdominal/groin pain since 4am. hx hernia

## 2020-05-20 NOTE — ED PROVIDER NOTE - PATIENT PORTAL LINK FT
You can access the FollowMyHealth Patient Portal offered by [facility] by registering at the following website: [email or web address]. By joining [employer]’s FollowMyHealth portal, you will also be able to view your health information using other applications (apps) compatible with our system.

## 2020-05-20 NOTE — ED ADULT NURSE NOTE - OBJECTIVE STATEMENT
54y male HTN presents to the ED complaining of RLQ abdominal pain radiating to right testicle (sharp 10/10 pain) started around 0400 his morning along with constipation , pt took 400mg ibuprofen when pain started. RLQ tender upon palpation Pt states no changes in bowel/bladder patterns other than constipation, pt denies any chest pain, SOB, fevers, chills, N/V/D. 18G Iv in Right AC labs drawn and sent as ordered, comfort and safety measures in place. 54y male HTN presents to the ED complaining of RLQ abdominal pain radiating to right testicle (sharp 10/10 pain) started around 0400 his morning along with constipation , pt took 400mg ibuprofen when pain started with no relief. RLQ tender upon palpation, no distention noted. Pt states no changes in bowel/bladder patterns other than mild constipation. pt denies any chest pain, SOB, fevers, chills, N/V/D. 18G Iv in Right AC labs drawn and sent as ordered, comfort and safety measures in place.

## 2020-05-20 NOTE — ED ADULT NURSE NOTE - NSIMPLEMENTINTERV_GEN_ALL_ED
Implemented All Universal Safety Interventions:  [place] to call system. Call bell, personal items and telephone within reach. Instruct patient to call for assistance. Room bathroom lighting operational. Non-slip footwear when patient is off stretcher. Physically safe environment: no spills, clutter or unnecessary equipment. Stretcher in lowest position, wheels locked, appropriate side rails in place.

## 2020-05-21 ENCOUNTER — APPOINTMENT (OUTPATIENT)
Dept: UROLOGY | Facility: CLINIC | Age: 55
End: 2020-05-21

## 2020-05-22 ENCOUNTER — APPOINTMENT (OUTPATIENT)
Dept: UROLOGY | Facility: CLINIC | Age: 55
End: 2020-05-22
Payer: COMMERCIAL

## 2020-05-22 VITALS — SYSTOLIC BLOOD PRESSURE: 153 MMHG | DIASTOLIC BLOOD PRESSURE: 91 MMHG | RESPIRATION RATE: 17 BRPM | HEART RATE: 83 BPM

## 2020-05-22 PROCEDURE — 99204 OFFICE O/P NEW MOD 45 MIN: CPT

## 2020-05-22 NOTE — PHYSICAL EXAM
[General Appearance - Well Nourished] : well nourished [General Appearance - Well Developed] : well developed [General Appearance - In No Acute Distress] : no acute distress [Edema] : no peripheral edema [Abdomen Soft] : soft [Exaggerated Use Of Accessory Muscles For Inspiration] : no accessory muscle use [Costovertebral Angle Tenderness] : no ~M costovertebral angle tenderness [Abdomen Tenderness] : non-tender [Normal Station and Gait] : the gait and station were normal for the patient's age [Skin Color & Pigmentation] : normal skin color and pigmentation [No Focal Deficits] : no focal deficits [Oriented To Time, Place, And Person] : oriented to person, place, and time [Cervical Lymph Nodes Enlarged Anterior Bilaterally] : anterior cervical [Supraclavicular Lymph Nodes Enlarged Bilaterally] : supraclavicular

## 2020-05-22 NOTE — HISTORY OF PRESENT ILLNESS
[FreeTextEntry1] : 54 year old M with cc of R ureteral stone. Pt reports that he developed R sided pain acutely in flank with radiation into RLQ and testicles. No nausea or emesis. He went to ER and had CT which showed 2mm stone at R UVJ. He was discharged with flomax and oxycodone. Cr was . WBC was . UA was negative. Never had a stone before. No family hx of stones. \par \par Drinks 4-5 cups of coffee per day and some water. \par \par Review of CT shows delayed nephrogram on R with mild R hydro down to level of UVJ. No additional stones seen.

## 2020-05-22 NOTE — ASSESSMENT
[FreeTextEntry1] : R UVJ stone. Small and high likelihood of passage. \par --medical expulsive therapy with flomax\par --pain control with oxycodone and tylenol and IBU\par --stone precautions\par --encourage fluid intake\par --renal US in 1mo to eval for passage\par --Decrease caffeine intake to decrease future stone risk\par --RTC in 1mo

## 2020-06-11 ENCOUNTER — NON-APPOINTMENT (OUTPATIENT)
Age: 55
End: 2020-06-11

## 2020-06-11 ENCOUNTER — APPOINTMENT (OUTPATIENT)
Dept: CARDIOLOGY | Facility: CLINIC | Age: 55
End: 2020-06-11
Payer: COMMERCIAL

## 2020-06-11 VITALS
OXYGEN SATURATION: 96 % | BODY MASS INDEX: 34.56 KG/M2 | HEIGHT: 68 IN | DIASTOLIC BLOOD PRESSURE: 95 MMHG | WEIGHT: 228 LBS | HEART RATE: 71 BPM | SYSTOLIC BLOOD PRESSURE: 152 MMHG

## 2020-06-11 DIAGNOSIS — Z87.442 PERSONAL HISTORY OF URINARY CALCULI: ICD-10-CM

## 2020-06-11 PROCEDURE — 93000 ELECTROCARDIOGRAM COMPLETE: CPT

## 2020-06-11 PROCEDURE — 99214 OFFICE O/P EST MOD 30 MIN: CPT

## 2020-06-22 ENCOUNTER — APPOINTMENT (OUTPATIENT)
Dept: UROLOGY | Facility: CLINIC | Age: 55
End: 2020-06-22
Payer: COMMERCIAL

## 2020-06-22 ENCOUNTER — OUTPATIENT (OUTPATIENT)
Dept: OUTPATIENT SERVICES | Facility: HOSPITAL | Age: 55
LOS: 1 days | End: 2020-06-22
Payer: COMMERCIAL

## 2020-06-22 VITALS — SYSTOLIC BLOOD PRESSURE: 145 MMHG | HEART RATE: 68 BPM | DIASTOLIC BLOOD PRESSURE: 83 MMHG

## 2020-06-22 VITALS — TEMPERATURE: 98.4 F

## 2020-06-22 DIAGNOSIS — Z98.890 OTHER SPECIFIED POSTPROCEDURAL STATES: Chronic | ICD-10-CM

## 2020-06-22 DIAGNOSIS — R35.0 FREQUENCY OF MICTURITION: ICD-10-CM

## 2020-06-22 DIAGNOSIS — Z96.642 PRESENCE OF LEFT ARTIFICIAL HIP JOINT: Chronic | ICD-10-CM

## 2020-06-22 PROCEDURE — 99213 OFFICE O/P EST LOW 20 MIN: CPT | Mod: 25

## 2020-06-22 PROCEDURE — 76705 ECHO EXAM OF ABDOMEN: CPT | Mod: 26

## 2020-06-22 PROCEDURE — 76705 ECHO EXAM OF ABDOMEN: CPT

## 2020-06-22 NOTE — ASSESSMENT
[FreeTextEntry1] : R UVJ stone. Passed. \par --D/c flomax\par --encourage fluid intake\par --COnt decreased caffeine\par --renal US in 1y

## 2020-06-22 NOTE — HISTORY OF PRESENT ILLNESS
[FreeTextEntry1] : 54 year old M with cc of R ureteral stone. Pt reports that he developed R sided pain acutely in flank with radiation into RLQ and testicles. No nausea or emesis. He went to ER and had CT which showed 2mm stone at R UVJ. He was discharged with flomax and oxycodone. Cr was . WBC was . UA was negative. Never had a stone before. No family hx of stones. Drinks 4-5 cups of coffee per day and some water. \par Review of CT showed delayed nephrogram on R with mild R hydro down to level of UVJ. No additional stones seen. Plan made for trial of passage with repeat imaging with US, \par \par Pt returns today for follow-up, he has had no issues since last visit. He has been cutting back on the caffeine and increasing his water intake. US today shows no hydro and no stones. PSA 0.9 this year\par \par At baseline, reports urinary frequency. He gets up 3x at night. He does not curb fluid intake prior to bed. Had flomax and had a little benefit.

## 2020-06-22 NOTE — PHYSICAL EXAM
[General Appearance - Well Developed] : well developed [General Appearance - Well Nourished] : well nourished [General Appearance - In No Acute Distress] : no acute distress [Abdomen Soft] : soft [Abdomen Tenderness] : non-tender [Costovertebral Angle Tenderness] : no ~M costovertebral angle tenderness [Skin Color & Pigmentation] : normal skin color and pigmentation [Edema] : no peripheral edema [Exaggerated Use Of Accessory Muscles For Inspiration] : no accessory muscle use [Oriented To Time, Place, And Person] : oriented to person, place, and time [Normal Station and Gait] : the gait and station were normal for the patient's age [No Focal Deficits] : no focal deficits [Cervical Lymph Nodes Enlarged Anterior Bilaterally] : anterior cervical [Supraclavicular Lymph Nodes Enlarged Bilaterally] : supraclavicular [FreeTextEntry1] : 15cc prostate, smooth, no nodules

## 2020-06-24 DIAGNOSIS — N20.1 CALCULUS OF URETER: ICD-10-CM

## 2020-06-24 NOTE — H&P PST ADULT - PROBLEM SELECTOR PLAN 1
Statement Selected
scheduled left hip replacement  obtaining medical clearance  patient was advised to get instructions on aspirin from PMD

## 2020-07-03 ENCOUNTER — RX RENEWAL (OUTPATIENT)
Age: 55
End: 2020-07-03

## 2020-07-13 ENCOUNTER — OUTPATIENT (OUTPATIENT)
Dept: OUTPATIENT SERVICES | Facility: HOSPITAL | Age: 55
LOS: 1 days | End: 2020-07-13
Payer: COMMERCIAL

## 2020-07-13 DIAGNOSIS — Z96.642 PRESENCE OF LEFT ARTIFICIAL HIP JOINT: Chronic | ICD-10-CM

## 2020-07-13 DIAGNOSIS — Z98.890 OTHER SPECIFIED POSTPROCEDURAL STATES: Chronic | ICD-10-CM

## 2020-07-13 DIAGNOSIS — Z00.00 ENCOUNTER FOR GENERAL ADULT MEDICAL EXAMINATION WITHOUT ABNORMAL FINDINGS: ICD-10-CM

## 2020-07-14 ENCOUNTER — RESULT REVIEW (OUTPATIENT)
Age: 55
End: 2020-07-14

## 2020-07-14 PROCEDURE — 71045 X-RAY EXAM CHEST 1 VIEW: CPT

## 2020-07-14 PROCEDURE — 71045 X-RAY EXAM CHEST 1 VIEW: CPT | Mod: 26

## 2020-07-21 ENCOUNTER — APPOINTMENT (OUTPATIENT)
Dept: INTERNAL MEDICINE | Facility: CLINIC | Age: 55
End: 2020-07-21
Payer: COMMERCIAL

## 2020-07-21 VITALS
HEIGHT: 68 IN | RESPIRATION RATE: 14 BRPM | HEART RATE: 69 BPM | SYSTOLIC BLOOD PRESSURE: 142 MMHG | OXYGEN SATURATION: 98 % | BODY MASS INDEX: 34.25 KG/M2 | TEMPERATURE: 98.1 F | DIASTOLIC BLOOD PRESSURE: 88 MMHG | WEIGHT: 226 LBS

## 2020-07-21 VITALS — DIASTOLIC BLOOD PRESSURE: 82 MMHG | SYSTOLIC BLOOD PRESSURE: 132 MMHG

## 2020-07-21 PROCEDURE — 36415 COLL VENOUS BLD VENIPUNCTURE: CPT

## 2020-07-21 PROCEDURE — 99214 OFFICE O/P EST MOD 30 MIN: CPT | Mod: 25

## 2020-07-21 NOTE — PHYSICAL EXAM
[No Acute Distress] : no acute distress [Well Nourished] : well nourished [Normal Sclera/Conjunctiva] : normal sclera/conjunctiva [Well Developed] : well developed [Well-Appearing] : well-appearing [PERRL] : pupils equal round and reactive to light [EOMI] : extraocular movements intact [Normal Outer Ear/Nose] : the outer ears and nose were normal in appearance [Normal Oropharynx] : the oropharynx was normal [No JVD] : no jugular venous distention [No Lymphadenopathy] : no lymphadenopathy [Supple] : supple [Thyroid Normal, No Nodules] : the thyroid was normal and there were no nodules present [No Accessory Muscle Use] : no accessory muscle use [No Respiratory Distress] : no respiratory distress  [Clear to Auscultation] : lungs were clear to auscultation bilaterally [Normal Rate] : normal rate  [Regular Rhythm] : with a regular rhythm [Normal S1, S2] : normal S1 and S2 [No Murmur] : no murmur heard [No Carotid Bruits] : no carotid bruits [No Abdominal Bruit] : a ~M bruit was not heard ~T in the abdomen [No Varicosities] : no varicosities [Pedal Pulses Present] : the pedal pulses are present [No Palpable Aorta] : no palpable aorta [No Edema] : there was no peripheral edema [Soft] : abdomen soft [No Extremity Clubbing/Cyanosis] : no extremity clubbing/cyanosis [Non Tender] : non-tender [Non-distended] : non-distended [Normal Bowel Sounds] : normal bowel sounds [No Masses] : no abdominal mass palpated [No HSM] : no HSM [Normal Posterior Cervical Nodes] : no posterior cervical lymphadenopathy [Normal Anterior Cervical Nodes] : no anterior cervical lymphadenopathy [No Spinal Tenderness] : no spinal tenderness [No CVA Tenderness] : no CVA  tenderness [No Joint Swelling] : no joint swelling [Grossly Normal Strength/Tone] : grossly normal strength/tone [Coordination Grossly Intact] : coordination grossly intact [No Focal Deficits] : no focal deficits [No Rash] : no rash [Deep Tendon Reflexes (DTR)] : deep tendon reflexes were 2+ and symmetric [Normal Gait] : normal gait [Normal Insight/Judgement] : insight and judgment were intact [Normal Affect] : the affect was normal

## 2020-07-21 NOTE — ASSESSMENT
[FreeTextEntry1] : If blood work OK- start INH 300mg daily and B6 daily. Return in 4 weeks for rechecks.\par HTN- repeat better.\par Hyperlipidemia- continue simvastatin\par WIll not use rifampin due to interaction with statin

## 2020-07-22 LAB
ALBUMIN SERPL ELPH-MCNC: 4.4 G/DL
ALP BLD-CCNC: 104 U/L
ALT SERPL-CCNC: 23 U/L
ANION GAP SERPL CALC-SCNC: 13 MMOL/L
AST SERPL-CCNC: 19 U/L
BILIRUB SERPL-MCNC: 0.5 MG/DL
BUN SERPL-MCNC: 16 MG/DL
CALCIUM SERPL-MCNC: 9.1 MG/DL
CHLORIDE SERPL-SCNC: 103 MMOL/L
CO2 SERPL-SCNC: 25 MMOL/L
CREAT SERPL-MCNC: 0.82 MG/DL
GLUCOSE SERPL-MCNC: 92 MG/DL
POTASSIUM SERPL-SCNC: 3.7 MMOL/L
PROT SERPL-MCNC: 7.3 G/DL
SODIUM SERPL-SCNC: 141 MMOL/L

## 2020-08-20 ENCOUNTER — APPOINTMENT (OUTPATIENT)
Dept: INTERNAL MEDICINE | Facility: CLINIC | Age: 55
End: 2020-08-20
Payer: COMMERCIAL

## 2020-08-20 VITALS
OXYGEN SATURATION: 97 % | SYSTOLIC BLOOD PRESSURE: 130 MMHG | RESPIRATION RATE: 14 BRPM | WEIGHT: 227 LBS | DIASTOLIC BLOOD PRESSURE: 76 MMHG | HEIGHT: 68 IN | BODY MASS INDEX: 34.4 KG/M2 | HEART RATE: 66 BPM | TEMPERATURE: 98.1 F

## 2020-08-20 PROCEDURE — 99213 OFFICE O/P EST LOW 20 MIN: CPT

## 2020-08-20 NOTE — PHYSICAL EXAM
[No Acute Distress] : no acute distress [Well Nourished] : well nourished [Well Developed] : well developed [Well-Appearing] : well-appearing [PERRL] : pupils equal round and reactive to light [Normal Sclera/Conjunctiva] : normal sclera/conjunctiva [EOMI] : extraocular movements intact [Normal Outer Ear/Nose] : the outer ears and nose were normal in appearance [Normal Oropharynx] : the oropharynx was normal [Supple] : supple [No JVD] : no jugular venous distention [No Lymphadenopathy] : no lymphadenopathy [No Accessory Muscle Use] : no accessory muscle use [Thyroid Normal, No Nodules] : the thyroid was normal and there were no nodules present [No Respiratory Distress] : no respiratory distress  [Normal Rate] : normal rate  [Clear to Auscultation] : lungs were clear to auscultation bilaterally [Regular Rhythm] : with a regular rhythm [No Murmur] : no murmur heard [Normal S1, S2] : normal S1 and S2 [No Abdominal Bruit] : a ~M bruit was not heard ~T in the abdomen [No Carotid Bruits] : no carotid bruits [No Varicosities] : no varicosities [Pedal Pulses Present] : the pedal pulses are present [No Edema] : there was no peripheral edema [No Palpable Aorta] : no palpable aorta [Soft] : abdomen soft [No Extremity Clubbing/Cyanosis] : no extremity clubbing/cyanosis [Non Tender] : non-tender [Non-distended] : non-distended [No Masses] : no abdominal mass palpated [No HSM] : no HSM [Normal Bowel Sounds] : normal bowel sounds [Normal Anterior Cervical Nodes] : no anterior cervical lymphadenopathy [Normal Posterior Cervical Nodes] : no posterior cervical lymphadenopathy [No CVA Tenderness] : no CVA  tenderness [No Spinal Tenderness] : no spinal tenderness [No Joint Swelling] : no joint swelling [Grossly Normal Strength/Tone] : grossly normal strength/tone [Coordination Grossly Intact] : coordination grossly intact [No Rash] : no rash [No Focal Deficits] : no focal deficits [Normal Affect] : the affect was normal [Normal Insight/Judgement] : insight and judgment were intact [Normal Gait] : normal gait

## 2020-08-21 ENCOUNTER — APPOINTMENT (OUTPATIENT)
Dept: INTERNAL MEDICINE | Facility: CLINIC | Age: 55
End: 2020-08-21

## 2020-08-21 LAB
ALBUMIN SERPL ELPH-MCNC: 4.4 G/DL
ALP BLD-CCNC: 94 U/L
ALT SERPL-CCNC: 26 U/L
ANION GAP SERPL CALC-SCNC: 13 MMOL/L
AST SERPL-CCNC: 25 U/L
BILIRUB SERPL-MCNC: 0.5 MG/DL
BUN SERPL-MCNC: 19 MG/DL
CALCIUM SERPL-MCNC: 9.2 MG/DL
CHLORIDE SERPL-SCNC: 105 MMOL/L
CO2 SERPL-SCNC: 24 MMOL/L
CREAT SERPL-MCNC: 0.89 MG/DL
GLUCOSE SERPL-MCNC: 94 MG/DL
POTASSIUM SERPL-SCNC: 4.2 MMOL/L
PROT SERPL-MCNC: 7.6 G/DL
SODIUM SERPL-SCNC: 142 MMOL/L

## 2020-09-22 ENCOUNTER — APPOINTMENT (OUTPATIENT)
Dept: INTERNAL MEDICINE | Facility: CLINIC | Age: 55
End: 2020-09-22
Payer: COMMERCIAL

## 2020-09-22 ENCOUNTER — NON-APPOINTMENT (OUTPATIENT)
Age: 55
End: 2020-09-22

## 2020-09-22 VITALS
OXYGEN SATURATION: 98 % | HEART RATE: 72 BPM | DIASTOLIC BLOOD PRESSURE: 76 MMHG | HEIGHT: 68 IN | BODY MASS INDEX: 34.4 KG/M2 | SYSTOLIC BLOOD PRESSURE: 128 MMHG | TEMPERATURE: 97.3 F | WEIGHT: 227 LBS | RESPIRATION RATE: 14 BRPM

## 2020-09-22 PROCEDURE — 93000 ELECTROCARDIOGRAM COMPLETE: CPT

## 2020-09-22 PROCEDURE — 99396 PREV VISIT EST AGE 40-64: CPT | Mod: 25

## 2020-09-22 NOTE — ASSESSMENT
[FreeTextEntry1] : HTN- good control Better control with hydralazine 25mg bid\par Hyperlipidemia- check labs\par HCM- good control.

## 2020-09-23 LAB
25(OH)D3 SERPL-MCNC: 39.8 NG/ML
ALBUMIN SERPL ELPH-MCNC: 4.4 G/DL
ALP BLD-CCNC: 91 U/L
ALT SERPL-CCNC: 27 U/L
ANION GAP SERPL CALC-SCNC: 9 MMOL/L
APPEARANCE: CLEAR
AST SERPL-CCNC: 25 U/L
BACTERIA: NEGATIVE
BASOPHILS # BLD AUTO: 0.05 K/UL
BASOPHILS NFR BLD AUTO: 0.7 %
BILIRUB SERPL-MCNC: 0.6 MG/DL
BILIRUBIN URINE: NEGATIVE
BLOOD URINE: NEGATIVE
BUN SERPL-MCNC: 17 MG/DL
CALCIUM SERPL-MCNC: 9.1 MG/DL
CHLORIDE SERPL-SCNC: 102 MMOL/L
CHOLEST SERPL-MCNC: 124 MG/DL
CHOLEST/HDLC SERPL: 2.7 RATIO
CO2 SERPL-SCNC: 27 MMOL/L
COLOR: YELLOW
CREAT SERPL-MCNC: 0.9 MG/DL
EOSINOPHIL # BLD AUTO: 0.09 K/UL
EOSINOPHIL NFR BLD AUTO: 1.3 %
ESTIMATED AVERAGE GLUCOSE: 108 MG/DL
FOLATE SERPL-MCNC: 10.5 NG/ML
GLUCOSE QUALITATIVE U: NEGATIVE
GLUCOSE SERPL-MCNC: 99 MG/DL
HBA1C MFR BLD HPLC: 5.4 %
HCT VFR BLD CALC: 47.6 %
HDLC SERPL-MCNC: 46 MG/DL
HGB BLD-MCNC: 15.6 G/DL
HYALINE CASTS: 0 /LPF
IMM GRANULOCYTES NFR BLD AUTO: 1 %
KETONES URINE: NEGATIVE
LDLC SERPL CALC-MCNC: 66 MG/DL
LEUKOCYTE ESTERASE URINE: NEGATIVE
LYMPHOCYTES # BLD AUTO: 1.79 K/UL
LYMPHOCYTES NFR BLD AUTO: 25.8 %
MAN DIFF?: NORMAL
MCHC RBC-ENTMCNC: 29.4 PG
MCHC RBC-ENTMCNC: 32.8 GM/DL
MCV RBC AUTO: 89.8 FL
MICROSCOPIC-UA: NORMAL
MONOCYTES # BLD AUTO: 0.72 K/UL
MONOCYTES NFR BLD AUTO: 10.4 %
NEUTROPHILS # BLD AUTO: 4.22 K/UL
NEUTROPHILS NFR BLD AUTO: 60.8 %
NITRITE URINE: NEGATIVE
PH URINE: 6.5
PLATELET # BLD AUTO: 231 K/UL
POTASSIUM SERPL-SCNC: 3.8 MMOL/L
PROT SERPL-MCNC: 7.5 G/DL
PROTEIN URINE: NEGATIVE
PSA SERPL-MCNC: 1.26 NG/ML
RBC # BLD: 5.3 M/UL
RBC # FLD: 13.2 %
RED BLOOD CELLS URINE: 1 /HPF
SODIUM SERPL-SCNC: 138 MMOL/L
SPECIFIC GRAVITY URINE: 1.01
SQUAMOUS EPITHELIAL CELLS: 0 /HPF
TRIGL SERPL-MCNC: 58 MG/DL
TSH SERPL-ACNC: 1.18 UIU/ML
UROBILINOGEN URINE: NORMAL
VIT B12 SERPL-MCNC: 564 PG/ML
WBC # FLD AUTO: 6.94 K/UL
WHITE BLOOD CELLS URINE: 0 /HPF

## 2020-09-27 LAB — HEMOCCULT STL QL IA: NEGATIVE

## 2020-10-10 ENCOUNTER — RX RENEWAL (OUTPATIENT)
Age: 55
End: 2020-10-10

## 2020-10-22 LAB
ALBUMIN SERPL ELPH-MCNC: 4.4 G/DL
ALP BLD-CCNC: 83 U/L
ALT SERPL-CCNC: 25 U/L
ANION GAP SERPL CALC-SCNC: 11 MMOL/L
AST SERPL-CCNC: 23 U/L
BILIRUB SERPL-MCNC: 0.5 MG/DL
BUN SERPL-MCNC: 18 MG/DL
CALCIUM SERPL-MCNC: 9.4 MG/DL
CHLORIDE SERPL-SCNC: 103 MMOL/L
CO2 SERPL-SCNC: 26 MMOL/L
CREAT SERPL-MCNC: 0.92 MG/DL
GLUCOSE SERPL-MCNC: 92 MG/DL
POTASSIUM SERPL-SCNC: 4.2 MMOL/L
PROT SERPL-MCNC: 7.1 G/DL
SODIUM SERPL-SCNC: 139 MMOL/L

## 2020-11-23 LAB
ALBUMIN SERPL ELPH-MCNC: 4.1 G/DL
ALP BLD-CCNC: 85 U/L
ALT SERPL-CCNC: 26 U/L
ANION GAP SERPL CALC-SCNC: 10 MMOL/L
AST SERPL-CCNC: 25 U/L
BILIRUB SERPL-MCNC: 0.4 MG/DL
BUN SERPL-MCNC: 15 MG/DL
CALCIUM SERPL-MCNC: 8.8 MG/DL
CHLORIDE SERPL-SCNC: 103 MMOL/L
CO2 SERPL-SCNC: 27 MMOL/L
CREAT SERPL-MCNC: 0.81 MG/DL
GLUCOSE SERPL-MCNC: 113 MG/DL
POTASSIUM SERPL-SCNC: 4.3 MMOL/L
PROT SERPL-MCNC: 7 G/DL
SODIUM SERPL-SCNC: 140 MMOL/L

## 2021-01-01 NOTE — H&P PST ADULT - PMH
Essential hypertension    History of pancreatitis  2012  History of stroke  2011, no residual left  JUAN PABLO on CPAP    Primary osteoarthritis of left hip    Seasonal allergies
27 Hour(s) 14 Minute(s)

## 2021-01-05 LAB
ALBUMIN SERPL ELPH-MCNC: 4.3 G/DL
ALP BLD-CCNC: 86 U/L
ALT SERPL-CCNC: 26 U/L
ANION GAP SERPL CALC-SCNC: 11 MMOL/L
AST SERPL-CCNC: 25 U/L
BILIRUB SERPL-MCNC: 0.5 MG/DL
BUN SERPL-MCNC: 17 MG/DL
CALCIUM SERPL-MCNC: 9.2 MG/DL
CHLORIDE SERPL-SCNC: 101 MMOL/L
CO2 SERPL-SCNC: 27 MMOL/L
CREAT SERPL-MCNC: 0.95 MG/DL
GLUCOSE SERPL-MCNC: 91 MG/DL
POTASSIUM SERPL-SCNC: 4.1 MMOL/L
PROT SERPL-MCNC: 7.7 G/DL
SODIUM SERPL-SCNC: 139 MMOL/L

## 2021-01-20 ENCOUNTER — RX RENEWAL (OUTPATIENT)
Age: 56
End: 2021-01-20

## 2021-01-26 NOTE — ED ADULT NURSE NOTE - PRO INTERPRETER NEED 2
Pt sts father tested positive for covid yesterday. Last interaction with father was 4 days ago. Last night with fever of 100.7 and HA.
English

## 2021-02-08 ENCOUNTER — TRANSCRIPTION ENCOUNTER (OUTPATIENT)
Age: 56
End: 2021-02-08

## 2021-02-08 LAB
ALBUMIN SERPL ELPH-MCNC: 4 G/DL
ALP BLD-CCNC: 89 U/L
ALT SERPL-CCNC: 29 U/L
ANION GAP SERPL CALC-SCNC: 11 MMOL/L
AST SERPL-CCNC: 21 U/L
BILIRUB SERPL-MCNC: 0.3 MG/DL
BUN SERPL-MCNC: 22 MG/DL
CALCIUM SERPL-MCNC: 8.5 MG/DL
CHLORIDE SERPL-SCNC: 103 MMOL/L
CO2 SERPL-SCNC: 26 MMOL/L
CREAT SERPL-MCNC: 0.92 MG/DL
GLUCOSE SERPL-MCNC: 110 MG/DL
POTASSIUM SERPL-SCNC: 3.8 MMOL/L
PROT SERPL-MCNC: 7.1 G/DL
SODIUM SERPL-SCNC: 140 MMOL/L

## 2021-03-17 DIAGNOSIS — Z22.7 LATENT TUBERCULOSIS: ICD-10-CM

## 2021-03-19 LAB
ALBUMIN SERPL ELPH-MCNC: 4.1 G/DL
ALP BLD-CCNC: 89 U/L
ALT SERPL-CCNC: 28 U/L
ANION GAP SERPL CALC-SCNC: 16 MMOL/L
AST SERPL-CCNC: 26 U/L
BILIRUB SERPL-MCNC: 0.6 MG/DL
BUN SERPL-MCNC: 17 MG/DL
CALCIUM SERPL-MCNC: 9.2 MG/DL
CHLORIDE SERPL-SCNC: 105 MMOL/L
CO2 SERPL-SCNC: 21 MMOL/L
CREAT SERPL-MCNC: 0.87 MG/DL
GLUCOSE SERPL-MCNC: 115 MG/DL
POTASSIUM SERPL-SCNC: 3.9 MMOL/L
PROT SERPL-MCNC: 7.4 G/DL
SODIUM SERPL-SCNC: 142 MMOL/L

## 2021-04-02 NOTE — ED PROVIDER NOTE - AGGRAVATING FACTORS
April 2, 2021     Charly Carlson, [address]    Patient: Maury Jauregui   YOB: 1991   Date of Visit: 4/2/2021       Dear Dr Jody Whyte: Thank you for referring Maury Jauregui to me for evaluation  Below are my notes for this consultation  If you have questions, please do not hesitate to call me  I look forward to following your patient along with you           Sincerely,        Dre Hernandez MD        CC: No Recipients  Dre Hernandez MD  4/2/2021  9:48 AM  Incomplete   NST is reactive and umbilical Dopplers are normal  Dre Hernandez MD
walking/movement

## 2021-04-08 ENCOUNTER — RX RENEWAL (OUTPATIENT)
Age: 56
End: 2021-04-08

## 2021-04-20 ENCOUNTER — APPOINTMENT (OUTPATIENT)
Dept: INTERNAL MEDICINE | Facility: CLINIC | Age: 56
End: 2021-04-20
Payer: COMMERCIAL

## 2021-04-20 VITALS — SYSTOLIC BLOOD PRESSURE: 120 MMHG | DIASTOLIC BLOOD PRESSURE: 80 MMHG

## 2021-04-20 VITALS
RESPIRATION RATE: 14 BRPM | BODY MASS INDEX: 36.37 KG/M2 | WEIGHT: 240 LBS | TEMPERATURE: 96.3 F | DIASTOLIC BLOOD PRESSURE: 80 MMHG | OXYGEN SATURATION: 97 % | HEIGHT: 68 IN | HEART RATE: 74 BPM | SYSTOLIC BLOOD PRESSURE: 146 MMHG

## 2021-04-20 PROCEDURE — 99072 ADDL SUPL MATRL&STAF TM PHE: CPT

## 2021-04-20 PROCEDURE — 99214 OFFICE O/P EST MOD 30 MIN: CPT

## 2021-04-20 NOTE — HISTORY OF PRESENT ILLNESS
[de-identified] : pt here today for follow up.\par Pt feeling well.\par Completed 9 months of INH for latent TB

## 2021-04-20 NOTE — ASSESSMENT
[FreeTextEntry1] : HTN- repeat better. Good control. \par Cardiomyopathy- follow up with cardiology\par Hyperlipidemia- good control

## 2021-05-11 ENCOUNTER — OUTPATIENT (OUTPATIENT)
Dept: OUTPATIENT SERVICES | Facility: HOSPITAL | Age: 56
LOS: 1 days | End: 2021-05-11
Payer: COMMERCIAL

## 2021-05-11 ENCOUNTER — APPOINTMENT (OUTPATIENT)
Dept: ULTRASOUND IMAGING | Facility: CLINIC | Age: 56
End: 2021-05-11
Payer: COMMERCIAL

## 2021-05-11 ENCOUNTER — APPOINTMENT (OUTPATIENT)
Dept: INTERNAL MEDICINE | Facility: CLINIC | Age: 56
End: 2021-05-11
Payer: COMMERCIAL

## 2021-05-11 VITALS
DIASTOLIC BLOOD PRESSURE: 90 MMHG | TEMPERATURE: 97.7 F | OXYGEN SATURATION: 97 % | BODY MASS INDEX: 36.07 KG/M2 | HEIGHT: 68 IN | WEIGHT: 238 LBS | HEART RATE: 68 BPM | RESPIRATION RATE: 14 BRPM | SYSTOLIC BLOOD PRESSURE: 138 MMHG

## 2021-05-11 DIAGNOSIS — Z96.642 PRESENCE OF LEFT ARTIFICIAL HIP JOINT: Chronic | ICD-10-CM

## 2021-05-11 DIAGNOSIS — M79.662 PAIN IN LEFT LOWER LEG: ICD-10-CM

## 2021-05-11 DIAGNOSIS — Z98.890 OTHER SPECIFIED POSTPROCEDURAL STATES: Chronic | ICD-10-CM

## 2021-05-11 PROCEDURE — 93971 EXTREMITY STUDY: CPT

## 2021-05-11 PROCEDURE — 93971 EXTREMITY STUDY: CPT | Mod: 26,LT

## 2021-05-11 PROCEDURE — 99072 ADDL SUPL MATRL&STAF TM PHE: CPT

## 2021-05-11 PROCEDURE — 99213 OFFICE O/P EST LOW 20 MIN: CPT

## 2021-05-11 NOTE — PHYSICAL EXAM
[No Acute Distress] : no acute distress [Well-Appearing] : well-appearing [Normal Voice/Communication] : normal voice/communication [No Respiratory Distress] : no respiratory distress  [No Accessory Muscle Use] : no accessory muscle use [Clear to Auscultation] : lungs were clear to auscultation bilaterally [Normal Rate] : normal rate  [Regular Rhythm] : with a regular rhythm [No Murmur] : no murmur heard [No Joint Swelling] : no joint swelling [Grossly Normal Strength/Tone] : grossly normal strength/tone [No Focal Deficits] : no focal deficits [Alert and Oriented x3] : oriented to person, place, and time

## 2021-05-11 NOTE — HISTORY OF PRESENT ILLNESS
[FreeTextEntry8] : Pain behind the knee for the past 2 days, radiates down into upper calf. Worse when getting up from seated position. Has taken ibuprofen which does help. No swelling in the calf or behind the knee.

## 2021-05-27 ENCOUNTER — RX RENEWAL (OUTPATIENT)
Age: 56
End: 2021-05-27

## 2021-06-23 ENCOUNTER — APPOINTMENT (OUTPATIENT)
Dept: UROLOGY | Facility: CLINIC | Age: 56
End: 2021-06-23
Payer: COMMERCIAL

## 2021-06-23 ENCOUNTER — OUTPATIENT (OUTPATIENT)
Dept: OUTPATIENT SERVICES | Facility: HOSPITAL | Age: 56
LOS: 1 days | End: 2021-06-23
Payer: COMMERCIAL

## 2021-06-23 VITALS
HEIGHT: 68 IN | HEART RATE: 60 BPM | WEIGHT: 233 LBS | SYSTOLIC BLOOD PRESSURE: 152 MMHG | BODY MASS INDEX: 35.31 KG/M2 | DIASTOLIC BLOOD PRESSURE: 88 MMHG | TEMPERATURE: 98 F | RESPIRATION RATE: 16 BRPM

## 2021-06-23 DIAGNOSIS — Z96.642 PRESENCE OF LEFT ARTIFICIAL HIP JOINT: Chronic | ICD-10-CM

## 2021-06-23 DIAGNOSIS — Z12.5 ENCOUNTER FOR SCREENING FOR MALIGNANT NEOPLASM OF PROSTATE: ICD-10-CM

## 2021-06-23 DIAGNOSIS — R35.0 FREQUENCY OF MICTURITION: ICD-10-CM

## 2021-06-23 DIAGNOSIS — Z98.890 OTHER SPECIFIED POSTPROCEDURAL STATES: Chronic | ICD-10-CM

## 2021-06-23 DIAGNOSIS — N20.1 CALCULUS OF URETER: ICD-10-CM

## 2021-06-23 PROCEDURE — 99213 OFFICE O/P EST LOW 20 MIN: CPT

## 2021-06-23 PROCEDURE — 76775 US EXAM ABDO BACK WALL LIM: CPT

## 2021-06-23 PROCEDURE — 76775 US EXAM ABDO BACK WALL LIM: CPT | Mod: 26

## 2021-06-23 NOTE — HISTORY OF PRESENT ILLNESS
[FreeTextEntry1] : 55 year old M with cc of stones. Pt seen 2020 after he developed R sided pain acutely in flank with radiation into RLQ and testicles. No nausea or emesis. He went to ER and had CT which showed 2mm stone at R UVJ. He was discharged with flomax and oxycodone. Cr was . WBC was . UA was negative. Never had a stone before. No family hx of stones. Drinks 4-5 cups of coffee per day and some water. \par Review of CT showed delayed nephrogram on R with mild R hydro down to level of UVJ. No additional stones seen. Plan made for trial of passage with repeat imaging with US. He was able to pass the stone on his own. He has been cutting back on the caffeine and increasing his water intake. PSA 1.26 in 9/2020. No family hx of prostate ca. \par \par At baseline, reports urinary frequency. He gets up 3x at night. He does not curb fluid intake prior to bed. Had flomax and had a little benefit. \par \par He comes in today for follow-up. Renal US today shows no stones and no hydro. He has switched to half decaf and half reg coffee. DOing much better. No new medical issues. No changed in urination as above.

## 2021-06-23 NOTE — ASSESSMENT
[FreeTextEntry1] : R UVJ stone. Passed. Discussed observation annually vs prn follow-up. Pt wishes to follow-up.\par --encourage fluid intake\par --COnt decreased caffeine\par --renal US in 1y

## 2021-07-07 ENCOUNTER — RX RENEWAL (OUTPATIENT)
Age: 56
End: 2021-07-07

## 2021-07-22 ENCOUNTER — RX RENEWAL (OUTPATIENT)
Age: 56
End: 2021-07-22

## 2021-07-30 NOTE — H&P PST ADULT - VENOUS THROMBOEMBOLISM CURRENT STATUS
RN suggested drawing labs/blood cultures, PA denied request. PA instructed RN to tell patient to increase  incentive spirometer use and administer tylenol.
elective major lower extremity arthroplasty

## 2021-08-23 ENCOUNTER — RX RENEWAL (OUTPATIENT)
Age: 56
End: 2021-08-23

## 2021-08-30 DIAGNOSIS — Z20.822 CONTACT WITH AND (SUSPECTED) EXPOSURE TO COVID-19: ICD-10-CM

## 2021-08-31 ENCOUNTER — APPOINTMENT (OUTPATIENT)
Dept: DISASTER EMERGENCY | Facility: CLINIC | Age: 56
End: 2021-08-31

## 2021-09-01 LAB — SARS-COV-2 N GENE NPH QL NAA+PROBE: NOT DETECTED

## 2021-09-02 ENCOUNTER — APPOINTMENT (OUTPATIENT)
Dept: PULMONOLOGY | Facility: CLINIC | Age: 56
End: 2021-09-02
Payer: COMMERCIAL

## 2021-09-02 VITALS
HEIGHT: 68 IN | BODY MASS INDEX: 35.77 KG/M2 | OXYGEN SATURATION: 94 % | SYSTOLIC BLOOD PRESSURE: 152 MMHG | HEART RATE: 74 BPM | WEIGHT: 236 LBS | DIASTOLIC BLOOD PRESSURE: 94 MMHG

## 2021-09-02 DIAGNOSIS — Z87.891 PERSONAL HISTORY OF NICOTINE DEPENDENCE: ICD-10-CM

## 2021-09-02 DIAGNOSIS — F17.290 NICOTINE DEPENDENCE, OTHER TOBACCO PRODUCT, UNCOMPLICATED: ICD-10-CM

## 2021-09-02 PROCEDURE — 94727 GAS DIL/WSHOT DETER LNG VOL: CPT

## 2021-09-02 PROCEDURE — 99204 OFFICE O/P NEW MOD 45 MIN: CPT | Mod: 25

## 2021-09-02 PROCEDURE — 94010 BREATHING CAPACITY TEST: CPT

## 2021-09-02 PROCEDURE — 94729 DIFFUSING CAPACITY: CPT

## 2021-09-02 PROCEDURE — ZZZZZ: CPT

## 2021-09-03 NOTE — HISTORY OF PRESENT ILLNESS
[Former] : former [Never] : never [TextBox_4] : LUCIO HAGAN is a 55 year old male who presents for JUAN PABLO re-eval\par He was diagnosed about 10 years ago\par has CPAP? DME [facility]\par got respironics machine- got note from [facility] re: recall\par \par initial symptoms: snoring, had CVA\par \par on cpap- feels well rested. no snoring. adequate rest\par no car accidents from fatigue\par using FFM\par

## 2021-09-03 NOTE — PROCEDURE
[FreeTextEntry1] : pfts within normal\par \par PA and lateral chest xray performed using standard projections. Bones and soft tissues structures are unremarkable.Cardiac silhouette appears normal. Lung fields are clear. No infiltrate or masses noted. Mediastinal contours are normal.\par IMPRESSION: no acute cardiopulmonary disease\par \par

## 2021-09-03 NOTE — PHYSICAL EXAM
[No Acute Distress] : no acute distress [Well Nourished] : well nourished [Well Developed] : well developed [IV] : Mallampati Class: IV [Normal Rate/Rhythm] : normal rate/rhythm [Normal S1, S2] : normal s1, s2 [No Resp Distress] : no resp distress [No Acc Muscle Use] : no acc muscle use [Clear to Auscultation Bilaterally] : clear to auscultation bilaterally [No Focal Deficits] : no focal deficits [Oriented x3] : oriented x3

## 2021-09-10 ENCOUNTER — APPOINTMENT (OUTPATIENT)
Dept: PULMONOLOGY | Facility: CLINIC | Age: 56
End: 2021-09-10
Payer: COMMERCIAL

## 2021-09-10 PROCEDURE — 95800 SLP STDY UNATTENDED: CPT | Mod: 52

## 2021-09-11 PROCEDURE — 95800 SLP STDY UNATTENDED: CPT

## 2021-09-24 ENCOUNTER — APPOINTMENT (OUTPATIENT)
Dept: INTERNAL MEDICINE | Facility: CLINIC | Age: 56
End: 2021-09-24
Payer: COMMERCIAL

## 2021-09-24 VITALS
WEIGHT: 238 LBS | HEART RATE: 61 BPM | BODY MASS INDEX: 36.19 KG/M2 | OXYGEN SATURATION: 97 % | TEMPERATURE: 97.5 F | SYSTOLIC BLOOD PRESSURE: 150 MMHG | RESPIRATION RATE: 14 BRPM | DIASTOLIC BLOOD PRESSURE: 94 MMHG

## 2021-09-24 VITALS — DIASTOLIC BLOOD PRESSURE: 90 MMHG | SYSTOLIC BLOOD PRESSURE: 140 MMHG

## 2021-09-24 PROCEDURE — 99396 PREV VISIT EST AGE 40-64: CPT

## 2021-09-24 RX ORDER — ISONIAZID 300 MG/1
300 TABLET ORAL
Qty: 10 | Refills: 0 | Status: DISCONTINUED | COMMUNITY
Start: 2020-07-22 | End: 2021-09-24

## 2021-09-24 RX ORDER — TAMSULOSIN HYDROCHLORIDE 0.4 MG/1
0.4 CAPSULE ORAL
Qty: 90 | Refills: 3 | Status: DISCONTINUED | COMMUNITY
Start: 2020-05-22 | End: 2021-09-24

## 2021-09-24 NOTE — PHYSICAL EXAM
[No Acute Distress] : no acute distress [Well Nourished] : well nourished [Well Developed] : well developed [Well-Appearing] : well-appearing [Normal Sclera/Conjunctiva] : normal sclera/conjunctiva [PERRL] : pupils equal round and reactive to light [EOMI] : extraocular movements intact [Normal Outer Ear/Nose] : the outer ears and nose were normal in appearance [Normal Oropharynx] : the oropharynx was normal [No JVD] : no jugular venous distention [No Lymphadenopathy] : no lymphadenopathy [Supple] : supple [No Respiratory Distress] : no respiratory distress  [Thyroid Normal, No Nodules] : the thyroid was normal and there were no nodules present [No Accessory Muscle Use] : no accessory muscle use [Clear to Auscultation] : lungs were clear to auscultation bilaterally [Normal Rate] : normal rate  [Regular Rhythm] : with a regular rhythm [Normal S1, S2] : normal S1 and S2 [No Murmur] : no murmur heard [No Carotid Bruits] : no carotid bruits [No Abdominal Bruit] : a ~M bruit was not heard ~T in the abdomen [No Varicosities] : no varicosities [Pedal Pulses Present] : the pedal pulses are present [No Edema] : there was no peripheral edema [No Palpable Aorta] : no palpable aorta [No Extremity Clubbing/Cyanosis] : no extremity clubbing/cyanosis [Soft] : abdomen soft [Non Tender] : non-tender [Non-distended] : non-distended [No Masses] : no abdominal mass palpated [No HSM] : no HSM [Normal Bowel Sounds] : normal bowel sounds [Normal Anterior Cervical Nodes] : no anterior cervical lymphadenopathy [Normal Posterior Cervical Nodes] : no posterior cervical lymphadenopathy [No CVA Tenderness] : no CVA  tenderness [No Spinal Tenderness] : no spinal tenderness [No Joint Swelling] : no joint swelling [Grossly Normal Strength/Tone] : grossly normal strength/tone [No Rash] : no rash [Coordination Grossly Intact] : coordination grossly intact [Normal Gait] : normal gait [No Focal Deficits] : no focal deficits [Deep Tendon Reflexes (DTR)] : deep tendon reflexes were 2+ and symmetric [Normal Affect] : the affect was normal [Normal Insight/Judgement] : insight and judgment were intact

## 2021-09-24 NOTE — HEALTH RISK ASSESSMENT
[Good] : ~his/her~  mood as  good [Patient reported colonoscopy was normal] : Patient reported colonoscopy was normal [ColonoscopyDate] : 01/17

## 2021-09-24 NOTE — PLAN
[FreeTextEntry1] : HCM: \par -check labs \par -UTD with colonoscopy \par -will get flu shot at work \par -pt is vaccinated against covid \par \par HTN: elevated, pt did not take hydralazine this AM, enforced compliance with meds, continue with amlodipine, metoprolol, losartan, and hydralazine \par HLD: check lipids, continue simvastatin \par JUAN PABLO: on CPAP, followed by pulm-Dr. Tai

## 2021-09-27 ENCOUNTER — APPOINTMENT (OUTPATIENT)
Dept: PULMONOLOGY | Facility: CLINIC | Age: 56
End: 2021-09-27
Payer: COMMERCIAL

## 2021-09-27 VITALS
HEIGHT: 68 IN | OXYGEN SATURATION: 96 % | SYSTOLIC BLOOD PRESSURE: 150 MMHG | BODY MASS INDEX: 36.37 KG/M2 | HEART RATE: 63 BPM | WEIGHT: 240 LBS | DIASTOLIC BLOOD PRESSURE: 89 MMHG

## 2021-09-27 LAB
25(OH)D3 SERPL-MCNC: 33.9 NG/ML
ALBUMIN SERPL ELPH-MCNC: 4.2 G/DL
ALP BLD-CCNC: 94 U/L
ALT SERPL-CCNC: 32 U/L
ANION GAP SERPL CALC-SCNC: 13 MMOL/L
AST SERPL-CCNC: 22 U/L
BASOPHILS # BLD AUTO: 0.03 K/UL
BASOPHILS NFR BLD AUTO: 0.4 %
BILIRUB SERPL-MCNC: 0.5 MG/DL
BUN SERPL-MCNC: 15 MG/DL
CALCIUM SERPL-MCNC: 8.8 MG/DL
CHLORIDE SERPL-SCNC: 103 MMOL/L
CHOLEST SERPL-MCNC: 135 MG/DL
CO2 SERPL-SCNC: 25 MMOL/L
CREAT SERPL-MCNC: 0.83 MG/DL
EOSINOPHIL # BLD AUTO: 0.11 K/UL
EOSINOPHIL NFR BLD AUTO: 1.5 %
ESTIMATED AVERAGE GLUCOSE: 117 MG/DL
FOLATE SERPL-MCNC: 10 NG/ML
GLUCOSE SERPL-MCNC: 101 MG/DL
HBA1C MFR BLD HPLC: 5.7 %
HCT VFR BLD CALC: 48.8 %
HDLC SERPL-MCNC: 47 MG/DL
HGB BLD-MCNC: 16 G/DL
IMM GRANULOCYTES NFR BLD AUTO: 0.1 %
LDLC SERPL CALC-MCNC: 69 MG/DL
LYMPHOCYTES # BLD AUTO: 1.87 K/UL
LYMPHOCYTES NFR BLD AUTO: 25.6 %
MAN DIFF?: NORMAL
MCHC RBC-ENTMCNC: 29.9 PG
MCHC RBC-ENTMCNC: 32.8 GM/DL
MCV RBC AUTO: 91 FL
MONOCYTES # BLD AUTO: 0.76 K/UL
MONOCYTES NFR BLD AUTO: 10.4 %
NEUTROPHILS # BLD AUTO: 4.52 K/UL
NEUTROPHILS NFR BLD AUTO: 62 %
NONHDLC SERPL-MCNC: 87 MG/DL
PLATELET # BLD AUTO: 263 K/UL
POTASSIUM SERPL-SCNC: 4 MMOL/L
PROT SERPL-MCNC: 7.6 G/DL
PSA SERPL-MCNC: 1.09 NG/ML
RBC # BLD: 5.36 M/UL
RBC # FLD: 13.4 %
SODIUM SERPL-SCNC: 141 MMOL/L
TRIGL SERPL-MCNC: 91 MG/DL
TSH SERPL-ACNC: 1.5 UIU/ML
VIT B12 SERPL-MCNC: 574 PG/ML
WBC # FLD AUTO: 7.3 K/UL

## 2021-09-27 PROCEDURE — 99213 OFFICE O/P EST LOW 20 MIN: CPT

## 2021-09-27 NOTE — ASSESSMENT
[FreeTextEntry1] : initiate auto cpap now via full face mask at pt request\par will cont to use old machine until new one available\par fu after 1 mo of use of new machine

## 2021-10-29 ENCOUNTER — NON-APPOINTMENT (OUTPATIENT)
Age: 56
End: 2021-10-29

## 2021-11-03 ENCOUNTER — NON-APPOINTMENT (OUTPATIENT)
Age: 56
End: 2021-11-03

## 2021-11-03 ENCOUNTER — APPOINTMENT (OUTPATIENT)
Dept: CARDIOLOGY | Facility: CLINIC | Age: 56
End: 2021-11-03
Payer: COMMERCIAL

## 2021-11-03 VITALS
BODY MASS INDEX: 35.77 KG/M2 | HEIGHT: 68 IN | SYSTOLIC BLOOD PRESSURE: 150 MMHG | DIASTOLIC BLOOD PRESSURE: 98 MMHG | WEIGHT: 236 LBS | HEART RATE: 62 BPM | OXYGEN SATURATION: 97 %

## 2021-11-03 DIAGNOSIS — I65.29 OCCLUSION AND STENOSIS OF UNSPECIFIED CAROTID ARTERY: ICD-10-CM

## 2021-11-03 PROCEDURE — 93000 ELECTROCARDIOGRAM COMPLETE: CPT

## 2021-11-03 PROCEDURE — 99214 OFFICE O/P EST MOD 30 MIN: CPT

## 2021-11-19 ENCOUNTER — MED ADMIN CHARGE (OUTPATIENT)
Age: 56
End: 2021-11-19

## 2021-11-19 ENCOUNTER — APPOINTMENT (OUTPATIENT)
Dept: CARDIOLOGY | Facility: CLINIC | Age: 56
End: 2021-11-19
Payer: COMMERCIAL

## 2021-11-19 PROCEDURE — 93306 TTE W/DOPPLER COMPLETE: CPT

## 2021-11-19 PROCEDURE — 93880 EXTRACRANIAL BILAT STUDY: CPT

## 2021-11-19 PROCEDURE — 93015 CV STRESS TEST SUPVJ I&R: CPT

## 2021-11-19 RX ORDER — PERFLUTREN 6.52 MG/ML
6.52 INJECTION, SUSPENSION INTRAVENOUS
Qty: 2 | Refills: 0 | Status: COMPLETED | OUTPATIENT
Start: 2021-11-19

## 2021-11-19 RX ADMIN — PERFLUTREN MG/ML: 6.52 INJECTION, SUSPENSION INTRAVENOUS at 00:00

## 2022-01-06 ENCOUNTER — RX RENEWAL (OUTPATIENT)
Age: 57
End: 2022-01-06

## 2022-01-17 NOTE — DISCHARGE NOTE ADULT - FUNCTIONAL SCREEN CURRENT LEVEL: BATHING, MLM
(3) assistive equipment and person Likely secondary to COVID / adenovirus and  DRESS   caution with tylenol and fevers.

## 2022-06-20 ENCOUNTER — APPOINTMENT (OUTPATIENT)
Dept: UROLOGY | Facility: CLINIC | Age: 57
End: 2022-06-20

## 2022-06-20 DIAGNOSIS — N20.0 CALCULUS OF KIDNEY: ICD-10-CM

## 2022-06-28 ENCOUNTER — APPOINTMENT (OUTPATIENT)
Dept: ULTRASOUND IMAGING | Facility: CLINIC | Age: 57
End: 2022-06-28

## 2022-07-05 ENCOUNTER — RX RENEWAL (OUTPATIENT)
Age: 57
End: 2022-07-05

## 2022-07-23 ENCOUNTER — RX RENEWAL (OUTPATIENT)
Age: 57
End: 2022-07-23

## 2022-08-17 ENCOUNTER — APPOINTMENT (OUTPATIENT)
Dept: ULTRASOUND IMAGING | Facility: CLINIC | Age: 57
End: 2022-08-17

## 2022-08-17 PROCEDURE — 76770 US EXAM ABDO BACK WALL COMP: CPT

## 2022-08-25 ENCOUNTER — APPOINTMENT (OUTPATIENT)
Dept: INTERNAL MEDICINE | Facility: CLINIC | Age: 57
End: 2022-08-25

## 2022-08-25 VITALS
HEIGHT: 68 IN | HEART RATE: 77 BPM | RESPIRATION RATE: 14 BRPM | DIASTOLIC BLOOD PRESSURE: 84 MMHG | WEIGHT: 235 LBS | OXYGEN SATURATION: 97 % | SYSTOLIC BLOOD PRESSURE: 140 MMHG | BODY MASS INDEX: 35.61 KG/M2

## 2022-08-25 DIAGNOSIS — J06.9 ACUTE UPPER RESPIRATORY INFECTION, UNSPECIFIED: ICD-10-CM

## 2022-08-25 PROCEDURE — 99213 OFFICE O/P EST LOW 20 MIN: CPT

## 2022-08-25 NOTE — HISTORY OF PRESENT ILLNESS
[FreeTextEntry8] : cc: cough\par \par Pt was at a water park a week ago. Next day he felt a little cough. He continues to have a cough. He went to another water park yesterday. Covid tested negative twice. It is a dry cough. No fever. \par Had covid twice already.

## 2022-09-01 ENCOUNTER — RX RENEWAL (OUTPATIENT)
Age: 57
End: 2022-09-01

## 2022-09-23 ENCOUNTER — APPOINTMENT (OUTPATIENT)
Dept: SURGERY | Facility: CLINIC | Age: 57
End: 2022-09-23

## 2022-09-23 VITALS
HEART RATE: 70 BPM | TEMPERATURE: 97.4 F | BODY MASS INDEX: 35.61 KG/M2 | HEIGHT: 68 IN | DIASTOLIC BLOOD PRESSURE: 94 MMHG | WEIGHT: 235 LBS | SYSTOLIC BLOOD PRESSURE: 140 MMHG | OXYGEN SATURATION: 98 %

## 2022-09-23 DIAGNOSIS — E66.9 OBESITY, UNSPECIFIED: ICD-10-CM

## 2022-09-23 DIAGNOSIS — E78.5 HYPERLIPIDEMIA, UNSPECIFIED: ICD-10-CM

## 2022-09-23 DIAGNOSIS — I42.9 CARDIOMYOPATHY, UNSPECIFIED: ICD-10-CM

## 2022-09-23 DIAGNOSIS — G47.33 OBSTRUCTIVE SLEEP APNEA (ADULT) (PEDIATRIC): ICD-10-CM

## 2022-09-23 PROCEDURE — G0447 BEHAVIOR COUNSEL OBESITY 15M: CPT

## 2022-09-23 PROCEDURE — 99214 OFFICE O/P EST MOD 30 MIN: CPT

## 2022-09-23 PROCEDURE — 99401 PREV MED CNSL INDIV APPRX 15: CPT

## 2022-09-23 NOTE — HISTORY OF PRESENT ILLNESS
[de-identified] : Today with new symptoms in the right groin.  This occurred following exercise and lifting.  Patient also reports a previous history of kidney stones.  His symptoms are described as pain shooting and radiating to the right testicle.  He no longer has symptoms in the left groin where he is known to have a left fat-containing inguinal hernia as assessed on previous encounters.

## 2022-09-23 NOTE — ASSESSMENT
[FreeTextEntry1] : Physical exam does not reveal any herniation on the right groin.  Left inguinal hernia persists remains soft and reducible.\par \par Would recommend proceeding with a left inguinal hernia repair as previously discussed.\par \par Risk, Benefits, and Alternatives to surgery have been discussed.  This includes but is not limited to bleeding, infection, damage to adjacent structures, need for additional surgery or interventions, adverse effects of anesthesia such as cardio-respiratory complications, prolonged intubation, cardiac arrhythmia, arrest, and or death.  Risks of forgoing surgery have also been discussed including progression of, and/or worsening of current condition which may then require urgent or emergent treatment or surgery.\par \par Educational material courtesy of the [employer] College [employer] with respect to inguinal and femoral hernias has been provided for the patient's review and all questions have been answered.\par \par Discussed with the patient a laparoscopic versus open versus robotic assisted repair.  The patient's obesity I would favor a robotic assisted laparoscopic repair with mesh.\par \par Routine presurgical testing preoperative medical and cardiac clearances will be requested and arranged.\par \par Follow-up postoperatively\par \par With respect to the patient's obesity, nutritional counseling has been provided. The patient is encouraged to remain calorie conscious and continue a low fat, low carbohydrate, high protein diet. Also, emphasis has been placed on the importance of adequate hydration, multi-vitamin supplementation and exercise.  (15 min)\par \par

## 2022-09-23 NOTE — PHYSICAL EXAM
[Normal Breath Sounds] : Normal breath sounds [Normal Heart Sounds] : normal heart sounds [Normal Rate and Rhythm] : normal rate and rhythm [No Rash or Lesion] : No rash or lesion [Alert] : alert [Oriented to Person] : oriented to person [Oriented to Place] : oriented to place [Oriented to Time] : oriented to time [Calm] : calm [de-identified] : Obese gentleman in no distress [de-identified] : EMMANUEL HINES EOMI [de-identified] : Obese, soft, nontender, nondistended, positive bowel sounds in all four quadrants.  Moderate sized fat containing left inguinal hernia.  Soft and reducible.  Non-tender.  no radiating symptoms.   [de-identified] : Testes descended bilaterally, smooth and symmetrical. [de-identified] : Ambulating without assistance

## 2022-09-30 ENCOUNTER — NON-APPOINTMENT (OUTPATIENT)
Age: 57
End: 2022-09-30

## 2022-09-30 ENCOUNTER — APPOINTMENT (OUTPATIENT)
Dept: INTERNAL MEDICINE | Facility: CLINIC | Age: 57
End: 2022-09-30

## 2022-09-30 VITALS
DIASTOLIC BLOOD PRESSURE: 96 MMHG | RESPIRATION RATE: 14 BRPM | WEIGHT: 234 LBS | SYSTOLIC BLOOD PRESSURE: 134 MMHG | HEART RATE: 74 BPM | BODY MASS INDEX: 35.46 KG/M2 | HEIGHT: 68 IN | OXYGEN SATURATION: 98 % | TEMPERATURE: 97.6 F

## 2022-09-30 VITALS — SYSTOLIC BLOOD PRESSURE: 150 MMHG | DIASTOLIC BLOOD PRESSURE: 90 MMHG

## 2022-09-30 PROCEDURE — 99396 PREV VISIT EST AGE 40-64: CPT | Mod: 25

## 2022-09-30 PROCEDURE — 93000 ELECTROCARDIOGRAM COMPLETE: CPT

## 2022-09-30 RX ORDER — TAMSULOSIN HYDROCHLORIDE 0.4 MG/1
0.4 CAPSULE ORAL
Qty: 90 | Refills: 3 | Status: DISCONTINUED | COMMUNITY
Start: 2021-10-14 | End: 2022-09-30

## 2022-09-30 RX ORDER — LORATADINE 10 MG/1
CAPSULE, LIQUID FILLED ORAL
Refills: 0 | Status: DISCONTINUED | COMMUNITY

## 2022-09-30 RX ORDER — BENZONATATE 100 MG/1
100 CAPSULE ORAL 3 TIMES DAILY
Qty: 20 | Refills: 0 | Status: DISCONTINUED | COMMUNITY
Start: 2022-08-25 | End: 2022-09-30

## 2022-09-30 NOTE — PLAN
[FreeTextEntry1] : HCM:\par -check labs \par -EKG sinus marina \par -due for colonoscopy in [place] with GI-Dr. Ramírez \par \par HTN: elevated today, pt reports when he checks it at home it is usually 150/90, increase hydralazine to TID, continue with amlodipine, metoprolol, losartan, will repeat BP at [facility] appt \par HLD: check lipids, continue simvastatin \par JUAN PABLO: on CPAP, followed by pulm-Dr. Tai. \par

## 2022-10-03 LAB
25(OH)D3 SERPL-MCNC: 33.3 NG/ML
ABO + RH PNL BLD: NORMAL
ALBUMIN SERPL ELPH-MCNC: 4.3 G/DL
ALP BLD-CCNC: 97 U/L
ALT SERPL-CCNC: 31 U/L
ANION GAP SERPL CALC-SCNC: 10 MMOL/L
AST SERPL-CCNC: 23 U/L
BASOPHILS # BLD AUTO: 0.04 K/UL
BASOPHILS NFR BLD AUTO: 0.5 %
BILIRUB SERPL-MCNC: 0.7 MG/DL
BUN SERPL-MCNC: 15 MG/DL
CALCIUM SERPL-MCNC: 9.4 MG/DL
CHLORIDE SERPL-SCNC: 104 MMOL/L
CHOLEST SERPL-MCNC: 133 MG/DL
CO2 SERPL-SCNC: 28 MMOL/L
CREAT SERPL-MCNC: 0.83 MG/DL
EGFR: 102 ML/MIN/1.73M2
EOSINOPHIL # BLD AUTO: 0.11 K/UL
EOSINOPHIL NFR BLD AUTO: 1.3 %
ESTIMATED AVERAGE GLUCOSE: 114 MG/DL
FOLATE SERPL-MCNC: 12.6 NG/ML
GLUCOSE SERPL-MCNC: 104 MG/DL
HBA1C MFR BLD HPLC: 5.6 %
HCT VFR BLD CALC: 49 %
HDLC SERPL-MCNC: 46 MG/DL
HGB BLD-MCNC: 16.5 G/DL
IMM GRANULOCYTES NFR BLD AUTO: 0.2 %
LDLC SERPL CALC-MCNC: 73 MG/DL
LYMPHOCYTES # BLD AUTO: 1.99 K/UL
LYMPHOCYTES NFR BLD AUTO: 23.1 %
MAN DIFF?: NORMAL
MCHC RBC-ENTMCNC: 30.2 PG
MCHC RBC-ENTMCNC: 33.7 GM/DL
MCV RBC AUTO: 89.7 FL
MONOCYTES # BLD AUTO: 0.81 K/UL
MONOCYTES NFR BLD AUTO: 9.4 %
NEUTROPHILS # BLD AUTO: 5.63 K/UL
NEUTROPHILS NFR BLD AUTO: 65.5 %
NONHDLC SERPL-MCNC: 87 MG/DL
PLATELET # BLD AUTO: 262 K/UL
POTASSIUM SERPL-SCNC: 4.1 MMOL/L
PROT SERPL-MCNC: 7.6 G/DL
PSA SERPL-MCNC: 1.16 NG/ML
RBC # BLD: 5.46 M/UL
RBC # FLD: 13.6 %
SODIUM SERPL-SCNC: 142 MMOL/L
TRIGL SERPL-MCNC: 74 MG/DL
TSH SERPL-ACNC: 1.17 UIU/ML
VIT B12 SERPL-MCNC: 556 PG/ML
WBC # FLD AUTO: 8.6 K/UL

## 2022-10-12 ENCOUNTER — APPOINTMENT (OUTPATIENT)
Dept: CARDIOLOGY | Facility: CLINIC | Age: 57
End: 2022-10-12

## 2022-10-12 VITALS
BODY MASS INDEX: 35.31 KG/M2 | HEART RATE: 63 BPM | DIASTOLIC BLOOD PRESSURE: 89 MMHG | WEIGHT: 233 LBS | OXYGEN SATURATION: 97 % | SYSTOLIC BLOOD PRESSURE: 156 MMHG | HEIGHT: 68 IN

## 2022-10-12 DIAGNOSIS — E78.5 HYPERLIPIDEMIA, UNSPECIFIED: ICD-10-CM

## 2022-10-12 DIAGNOSIS — I71.2 THORACIC AORTIC ANEURYSM, W/OUT RUPTURE: ICD-10-CM

## 2022-10-12 DIAGNOSIS — R03.0 ELEVATED BLOOD-PRESSURE READING, W/OUT DIAGNOSIS OF HYPERTENSION: ICD-10-CM

## 2022-10-12 PROCEDURE — 93000 ELECTROCARDIOGRAM COMPLETE: CPT

## 2022-10-12 PROCEDURE — 99214 OFFICE O/P EST MOD 30 MIN: CPT | Mod: 25

## 2022-10-12 NOTE — DISCUSSION/SUMMARY
[FreeTextEntry1] : Benjamin is a 56-year-old male here for follow-up.  Overall, he is doing well, without worrisome symptoms. His blood pressure is notably elevated, despite increasing his hydralazine.  There is definitely a reactive component, and he will begin checking his numbers more religiously at home.  I am increasing his hydralazine to 50 mg twice daily, which may help with compliance.  He will remain on the remainder of his medications.\par \par I am going to repeat his echocardiogram to evaluate the size of his aortic root.  He will also have a 24-hour ambulatory blood pressure monitor, to evaluate his home trend.\par \par He will continue to eat right and exercise.  He will use his CPAP for obstructive apnea.\par We will speak after the above testing, and arrange follow-up. [EKG obtained to assist in diagnosis and management of assessed problem(s)] : EKG obtained to assist in diagnosis and management of assessed problem(s)

## 2022-10-12 NOTE — HISTORY OF PRESENT ILLNESS
[FreeTextEntry1] : Mr. Leary returns for evaluation of hypertension, hyperlipidemia, and history of mild left ventricular dysfunction.\par \par He was last seen by Dr. 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 By way of review, in 2012, he presented with right-sided weakness and numbness with evaluation revealing a small acute left corona radiata infarct. In April of 2012, he was hospitalized with pancreatitis felt to be possibly secondary to hydrochlorothiazide. He has been feeling well and does cardio exercise. He reports no chest pain, dyspnea, palpitations, lightheadedness, or syncope\par \par In 2021, he had a full cardiac evaluation.  An exercise stress test was suboptimal, while he was on beta-blockers, and only got his heart rate up to 62% of max predicted at over 9 minutes of exercise.  Carotid Doppler with minimal atherosclerosis bilaterally.  Echocardiogram with normal left ventricular systolic function, and mildly dilated proximal ascending aorta at 4.1 cm, along with diastolic dysfunction\par \par Though feeling quite well, his blood pressure has been more labile as of late, with numbers in the 150 range at home.  His hydralazine was recently increased to 25 3 times daily by his primary doctor.\par \par Past medical history is remarkable for left THR 3/17, hypertension, myopericarditis, varicose veins, hyperlipidemia, stroke as described, pancreatitis as described, and cardiac catheterization in 2009 at which time he was noted to have normal coronary arteries and mild to moderate global left ventricular dysfunction with an ejection fraction of 40%.\par \par

## 2022-10-13 ENCOUNTER — NON-APPOINTMENT (OUTPATIENT)
Age: 57
End: 2022-10-13

## 2022-10-13 ENCOUNTER — OUTPATIENT (OUTPATIENT)
Dept: OUTPATIENT SERVICES | Facility: HOSPITAL | Age: 57
LOS: 1 days | End: 2022-10-13
Payer: COMMERCIAL

## 2022-10-13 VITALS
WEIGHT: 233.03 LBS | RESPIRATION RATE: 16 BRPM | HEART RATE: 62 BPM | SYSTOLIC BLOOD PRESSURE: 160 MMHG | TEMPERATURE: 97 F | OXYGEN SATURATION: 97 % | DIASTOLIC BLOOD PRESSURE: 89 MMHG

## 2022-10-13 DIAGNOSIS — G47.33 OBSTRUCTIVE SLEEP APNEA (ADULT) (PEDIATRIC): ICD-10-CM

## 2022-10-13 DIAGNOSIS — Z98.890 OTHER SPECIFIED POSTPROCEDURAL STATES: Chronic | ICD-10-CM

## 2022-10-13 DIAGNOSIS — Z01.818 ENCOUNTER FOR OTHER PREPROCEDURAL EXAMINATION: ICD-10-CM

## 2022-10-13 DIAGNOSIS — Z96.642 PRESENCE OF LEFT ARTIFICIAL HIP JOINT: Chronic | ICD-10-CM

## 2022-10-13 DIAGNOSIS — K40.90 UNILATERAL INGUINAL HERNIA, WITHOUT OBSTRUCTION OR GANGRENE, NOT SPECIFIED AS RECURRENT: ICD-10-CM

## 2022-10-13 LAB — BLD GP AB SCN SERPL QL: SIGNIFICANT CHANGE UP

## 2022-10-13 PROCEDURE — 86900 BLOOD TYPING SEROLOGIC ABO: CPT

## 2022-10-13 PROCEDURE — 86850 RBC ANTIBODY SCREEN: CPT

## 2022-10-13 PROCEDURE — 86901 BLOOD TYPING SEROLOGIC RH(D): CPT

## 2022-10-13 PROCEDURE — 36415 COLL VENOUS BLD VENIPUNCTURE: CPT

## 2022-10-13 PROCEDURE — G0463: CPT

## 2022-10-13 RX ORDER — METOPROLOL TARTRATE 50 MG
1 TABLET ORAL
Qty: 0 | Refills: 0 | DISCHARGE

## 2022-10-13 RX ORDER — LORATADINE 10 MG/1
1 TABLET ORAL
Qty: 0 | Refills: 0 | DISCHARGE

## 2022-10-13 RX ORDER — AMLODIPINE BESYLATE 2.5 MG/1
1 TABLET ORAL
Qty: 0 | Refills: 0 | DISCHARGE

## 2022-10-13 RX ORDER — FLUTICASONE PROPIONATE 50 MCG
1 SPRAY, SUSPENSION NASAL
Qty: 0 | Refills: 0 | DISCHARGE

## 2022-10-13 NOTE — H&P PST ADULT - NSICDXPASTSURGICALHX_GEN_ALL_CORE_FT
PAST SURGICAL HISTORY:  History of left hip replacement (2017)    S/P colonoscopy     S/P trigger finger release (Right, 9/2010)

## 2022-10-13 NOTE — H&P PST ADULT - MUSCULOSKELETAL
negative decreased ROM due to pain normal/ROM intact/normal gait/strength 5/5 bilateral upper extremities/strength 5/5 bilateral lower extremities

## 2022-10-13 NOTE — H&P PST ADULT - PROBLEM SELECTOR PLAN 1
scheduled left hip replacement  obtaining medical clearance  patient was advised to get instructions on aspirin from PMD Robotic assisted laparoscopic left inguinal hernia repair on 10/24/2022.

## 2022-10-13 NOTE — H&P PST ADULT - ASSESSMENT
50 y/o male with left hip pain 57 y/o male with unilateral inguinal hernia, without obstruction, without gangrene, not specified as recurrent.

## 2022-10-13 NOTE — H&P PST ADULT - NSICDXPASTMEDICALHX_GEN_ALL_CORE_FT
PAST MEDICAL HISTORY:  Essential hypertension     History of pancreatitis 2012 due to taking HCTZ    History of stroke 2011, no residual left    JUAN PABLO on CPAP     Seasonal allergies      PAST MEDICAL HISTORY:  Essential hypertension     H/O squamous cell carcinoma excision (Right ring finger)    History of pancreatitis 2012 due to taking HCTZ    History of stroke 2011, no neuro deficits    Hyperlipidemia     JUAN PABLO on CPAP     Seasonal allergies     Unilateral inguinal hernia, without obstruction or gangrene, not specified as recurrent

## 2022-10-13 NOTE — H&P PST ADULT - PROBLEM SELECTOR PLAN 3
Medical clearance needed as per surgeon. CBC, Comprehensive panel and EKG from 9/30/22 received in chart. T&S ordered. Pre-op instructions and surgical scrubs given and pt verbalized understanding. COVID19 PCR testing to be done within 72 hours of surgery at [facility].

## 2022-10-13 NOTE — H&P PST ADULT - PROBLEM SELECTOR PROBLEM 1
Primary osteoarthritis of left hip Unilateral inguinal hernia, without obstruction or gangrene, not specified as recurrent

## 2022-10-13 NOTE — H&P PST ADULT - HISTORY OF PRESENT ILLNESS
55 y/o male with PMH of JUAN PABLO, HTN  Pt states he was diagnosed with left inguinal hernia on an incidental finding on a hip MRI about a few years ago. Pt denies bulge and pain of left groin.  57 y/o male with PMH of JUAN PABLO, HTN and CVA (2011, no neuro deficits here for PST. Pt states he was diagnosed with left inguinal hernia on an incidental finding on a hip MRI about a few years ago. Pt denies bulge and pain of left groin. Pt denies n/v/d and abdominal pain. Pt electing for robotic assisted laparoscopic left inguinal hernia repair on 10/24/2022.   55 y/o male with PMH of JUAN PABLO, HTN and CVA (2011, no neuro deficits) here for PST. Pt states he was diagnosed with left inguinal hernia on an incidental finding on a hip MRI about a few years ago. Pt denies bulge and pain of left groin. Pt denies n/v/d and abdominal pain. Pt electing for robotic assisted laparoscopic left inguinal hernia repair on 10/24/2022.

## 2022-10-13 NOTE — H&P PST ADULT - FALL HARM RISK - UNIVERSAL INTERVENTIONS
Bed in lowest position, wheels locked, appropriate side rails in place/Call bell, personal items and telephone in reach/Instruct patient to call for assistance before getting out of bed or chair/Non-slip footwear when patient is out of bed/[place] to call system/Physically safe environment - no spills, clutter or unnecessary equipment/Purposeful Proactive Rounding/Room/bathroom lighting operational, light cord in reach

## 2022-10-13 NOTE — H&P PST ADULT - GENERAL COMMENTS
Pt denies having traveled outside the country for the last 14 days. Pt denies COVID19 positive contacts within the last 14 days. Denies recent illness in the last 2 weeks.

## 2022-10-14 ENCOUNTER — NON-APPOINTMENT (OUTPATIENT)
Age: 57
End: 2022-10-14

## 2022-10-18 ENCOUNTER — APPOINTMENT (OUTPATIENT)
Dept: INTERNAL MEDICINE | Facility: CLINIC | Age: 57
End: 2022-10-18

## 2022-10-18 VITALS
WEIGHT: 234 LBS | SYSTOLIC BLOOD PRESSURE: 130 MMHG | RESPIRATION RATE: 14 BRPM | DIASTOLIC BLOOD PRESSURE: 70 MMHG | HEIGHT: 68 IN | OXYGEN SATURATION: 98 % | BODY MASS INDEX: 35.46 KG/M2 | HEART RATE: 72 BPM

## 2022-10-18 DIAGNOSIS — Z01.818 ENCOUNTER FOR OTHER PREPROCEDURAL EXAMINATION: ICD-10-CM

## 2022-10-18 PROCEDURE — 99214 OFFICE O/P EST MOD 30 MIN: CPT

## 2022-10-18 RX ORDER — ASPIRIN 81 MG/1
81 TABLET ORAL DAILY
Refills: 0 | Status: ACTIVE | COMMUNITY
Start: 2022-10-18

## 2022-10-18 RX ORDER — ASPIRIN 325 MG/1
325 TABLET, FILM COATED ORAL
Refills: 0 | Status: DISCONTINUED | COMMUNITY
End: 2022-10-18

## 2022-10-18 RX ORDER — HYDRALAZINE HYDROCHLORIDE 25 MG/1
25 TABLET ORAL
Qty: 180 | Refills: 1 | Status: DISCONTINUED | COMMUNITY
Start: 2020-06-11 | End: 2022-10-18

## 2022-10-18 NOTE — HISTORY OF PRESENT ILLNESS
[No Pertinent Cardiac History] : no history of aortic stenosis, atrial fibrillation, coronary artery disease, recent myocardial infarction, or implantable device/pacemaker [Coronary Artery Disease] : coronary artery disease [Sleep Apnea] : sleep apnea [(Patient denies any chest pain, claudication, dyspnea on exertion, orthopnea, palpitations or syncope)] : Patient denies any chest pain, claudication, dyspnea on exertion, orthopnea, palpitations or syncope [Good (7-10 METs)] : Good (7-10 METs) [Anti-Platelet Agents: _____] : Anti-Platelet Agents: [unfilled] [Aortic Stenosis] : no aortic stenosis [Atrial Fibrillation] : no atrial fibrillation [Recent Myocardial Infarction] : no recent myocardial infarction [Implantable Device/Pacemaker] : no implantable device/pacemaker [Asthma] : no asthma [COPD] : no COPD [Smoker] : not a smoker [Family Member] : no family member with adverse anesthesia reaction/sudden death [Self] : no previous adverse anesthesia reaction [Chronic Anticoagulation] : no chronic anticoagulation [Chronic Kidney Disease] : no chronic kidney disease [Diabetes] : no diabetes [FreeTextEntry1] : left laparoscopic inguinal hernia repair [FreeTextEntry2] : 10/24/22 [FreeTextEntry3] : Dr. Chacon [FreeTextEntry4] : 57 yo male presents for preop clearance for L laparoscopic inguinal hernia repair with Dr. Chacon at [facility] on 10/24. Patient feeling well today, Denies CP, SOB, fevers, chills. Patient does have hx of TIA but denies prior hx of ischemic heart disease, CHF, insulin use.  [FreeTextEntry7] : EKG done 10/14, shows sinus bradycardia no ischemic changes

## 2022-10-18 NOTE — ASSESSMENT
[Patient Optimized for Surgery] : Patient optimized for surgery [No Further Testing Recommended] : no further testing recommended [Modify anti-platelet treatment prior to procedure] : Modify anti-platelet treatment prior to procedure [High Risk Surgery - Intraperitoneal, Intrathoracic or Supringuinal Vascular Procedures] : High Risk Surgery - Intraperitoneal, Intrathoracic or Supringuinal Vascular Procedures - No (0) [Ischemic Heart Disease] : Ischemic Heart Disease - No (0) [Congestive Heart Failure] : Congestive Heart Failure - No (0) [Prior Cerebrovascular Accident or TIA] : Prior Cerebrovascular Accident or TIA - Yes (1) [Creatinine >= 2mg/dL (1 Point)] : Creatinine >= 2mg/dL - No (0) [Insulin-dependent Diabetic (1 Point)] : Insulin-dependent Diabetic - No (0) [1] : 1 , RCRI Class: II, Risk of Post-Op Cardiac Complications: 6.0%, 95% CI for Risk Estimate: 4.9% - 7.4%

## 2022-10-18 NOTE — PLAN
[FreeTextEntry1] : Patient instructed to stop ASA, MV, Tumeric 7days prior to procedure, patient states he has stopped them yesterday\par Can continue BP meds as prescribed\par Blood work reviewed from 9/30/22 acceptable, and EKG reviewed from 10/14 shows sinus bradycardia, no ischemic changes\par Patient moderate risk candidate for low risk procedure, patient optimized from medical standpoint for planned procedure with routine hemodynamic monitoring.
Simple: Patient demonstrates quick and easy understanding

## 2022-10-18 NOTE — PHYSICAL EXAM
[No Acute Distress] : no acute distress [Well Nourished] : well nourished [Well Developed] : well developed [Well-Appearing] : well-appearing [Normal Sclera/Conjunctiva] : normal sclera/conjunctiva [EOMI] : extraocular movements intact [Normal Outer Ear/Nose] : the outer ears and nose were normal in appearance [Normal Oropharynx] : the oropharynx was normal [No JVD] : no jugular venous distention [No Lymphadenopathy] : no lymphadenopathy [Supple] : supple [Thyroid Normal, No Nodules] : the thyroid was normal and there were no nodules present [No Respiratory Distress] : no respiratory distress  [No Accessory Muscle Use] : no accessory muscle use [Clear to Auscultation] : lungs were clear to auscultation bilaterally [Normal Rate] : normal rate  [Regular Rhythm] : with a regular rhythm [Normal S1, S2] : normal S1 and S2 [No Murmur] : no murmur heard [No Carotid Bruits] : no carotid bruits [No Abdominal Bruit] : a ~M bruit was not heard ~T in the abdomen [No Varicosities] : no varicosities [Pedal Pulses Present] : the pedal pulses are present [No Edema] : there was no peripheral edema [No Palpable Aorta] : no palpable aorta [No Extremity Clubbing/Cyanosis] : no extremity clubbing/cyanosis [Soft] : abdomen soft [Non Tender] : non-tender [Non-distended] : non-distended [No Masses] : no abdominal mass palpated [No HSM] : no HSM [Normal Bowel Sounds] : normal bowel sounds [Normal Posterior Cervical Nodes] : no posterior cervical lymphadenopathy [Normal Anterior Cervical Nodes] : no anterior cervical lymphadenopathy [No Joint Swelling] : no joint swelling [Grossly Normal Strength/Tone] : grossly normal strength/tone [No Rash] : no rash [Coordination Grossly Intact] : coordination grossly intact [No Focal Deficits] : no focal deficits [Normal Gait] : normal gait [Normal Affect] : the affect was normal [Deep Tendon Reflexes (DTR)] : deep tendon reflexes were 2+ and symmetric [Normal Insight/Judgement] : insight and judgment were intact

## 2022-10-19 ENCOUNTER — APPOINTMENT (OUTPATIENT)
Dept: CARDIOLOGY | Facility: CLINIC | Age: 57
End: 2022-10-19

## 2022-10-19 PROBLEM — K40.90 UNILATERAL INGUINAL HERNIA, WITHOUT OBSTRUCTION OR GANGRENE, NOT SPECIFIED AS RECURRENT: Chronic | Status: ACTIVE | Noted: 2022-10-13

## 2022-10-19 PROBLEM — Z98.890 OTHER SPECIFIED POSTPROCEDURAL STATES: Chronic | Status: ACTIVE | Noted: 2022-10-13

## 2022-10-19 PROBLEM — Z87.19 PERSONAL HISTORY OF OTHER DISEASES OF THE DIGESTIVE SYSTEM: Chronic | Status: ACTIVE | Noted: 2017-03-21

## 2022-10-19 PROBLEM — E78.5 HYPERLIPIDEMIA, UNSPECIFIED: Chronic | Status: ACTIVE | Noted: 2022-10-13

## 2022-10-19 PROBLEM — Z86.73 PERSONAL HISTORY OF TRANSIENT ISCHEMIC ATTACK (TIA), AND CEREBRAL INFARCTION WITHOUT RESIDUAL DEFICITS: Chronic | Status: ACTIVE | Noted: 2017-03-21

## 2022-10-19 PROCEDURE — 93306 TTE W/DOPPLER COMPLETE: CPT

## 2022-10-19 RX ORDER — PERFLUTREN 6.52 MG/ML
6.52 INJECTION, SUSPENSION INTRAVENOUS
Qty: 1 | Refills: 0 | Status: COMPLETED | OUTPATIENT
Start: 2022-10-19

## 2022-10-19 RX ADMIN — PERFLUTREN MG/ML: 6.52 INJECTION, SUSPENSION INTRAVENOUS at 00:00

## 2022-10-21 ENCOUNTER — NON-APPOINTMENT (OUTPATIENT)
Age: 57
End: 2022-10-21

## 2022-10-21 ENCOUNTER — OUTPATIENT (OUTPATIENT)
Dept: OUTPATIENT SERVICES | Facility: HOSPITAL | Age: 57
LOS: 1 days | End: 2022-10-21
Payer: COMMERCIAL

## 2022-10-21 DIAGNOSIS — Z98.890 OTHER SPECIFIED POSTPROCEDURAL STATES: Chronic | ICD-10-CM

## 2022-10-21 DIAGNOSIS — Z20.828 CONTACT WITH AND (SUSPECTED) EXPOSURE TO OTHER VIRAL COMMUNICABLE DISEASES: ICD-10-CM

## 2022-10-21 DIAGNOSIS — Z96.642 PRESENCE OF LEFT ARTIFICIAL HIP JOINT: Chronic | ICD-10-CM

## 2022-10-21 LAB — SARS-COV-2 RNA SPEC QL NAA+PROBE: SIGNIFICANT CHANGE UP

## 2022-10-21 PROCEDURE — U0003: CPT

## 2022-10-21 PROCEDURE — U0005: CPT

## 2022-10-21 NOTE — ASU PATIENT PROFILE, ADULT - FALL HARM RISK - UNIVERSAL INTERVENTIONS
Bed in lowest position, wheels locked, appropriate side rails in place/Call bell, personal items and telephone in reach/Instruct patient to call for assistance before getting out of bed or chair/Non-slip footwear when patient is out of bed/[place] to call system/Physically safe environment - no spills, clutter or unnecessary equipment/Purposeful Proactive Rounding/Room/bathroom lighting operational, light cord in reach

## 2022-10-21 NOTE — ASU PATIENT PROFILE, ADULT - TEACHING/LEARNING FACTORS INFLUENCE READINESS TO LEARN
OB?  No  Pharmacy Verified? Yes   Reason for Call: refill, ethynodiol-ethinyl estradiol (KELNOR 1/35) 1-35 MG-MCG per tablet  Annual is scheduled   Best form of Contact:      Cell Phone:   Telephone Information:   Mobile [phone]     Okay to leave a detailed message regarding call? Yes    
none

## 2022-10-21 NOTE — ASU PATIENT PROFILE, ADULT - NSICDXPASTMEDICALHX_GEN_ALL_CORE_FT
PAST MEDICAL HISTORY:  Essential hypertension     H/O squamous cell carcinoma excision (Right ring finger)    History of pancreatitis 2012 due to taking HCTZ    History of stroke 2011, no neuro deficits    Hyperlipidemia     JUAN PABLO on CPAP     Seasonal allergies     Unilateral inguinal hernia, without obstruction or gangrene, not specified as recurrent

## 2022-10-23 ENCOUNTER — TRANSCRIPTION ENCOUNTER (OUTPATIENT)
Age: 57
End: 2022-10-23

## 2022-10-24 ENCOUNTER — TRANSCRIPTION ENCOUNTER (OUTPATIENT)
Age: 57
End: 2022-10-24

## 2022-10-24 ENCOUNTER — OUTPATIENT (OUTPATIENT)
Dept: OUTPATIENT SERVICES | Facility: HOSPITAL | Age: 57
LOS: 1 days | End: 2022-10-24
Payer: COMMERCIAL

## 2022-10-24 ENCOUNTER — APPOINTMENT (OUTPATIENT)
Dept: SURGERY | Facility: HOSPITAL | Age: 57
End: 2022-10-24

## 2022-10-24 VITALS
HEART RATE: 65 BPM | TEMPERATURE: 98 F | SYSTOLIC BLOOD PRESSURE: 129 MMHG | DIASTOLIC BLOOD PRESSURE: 75 MMHG | OXYGEN SATURATION: 96 % | RESPIRATION RATE: 12 BRPM

## 2022-10-24 VITALS
TEMPERATURE: 97 F | DIASTOLIC BLOOD PRESSURE: 81 MMHG | RESPIRATION RATE: 14 BRPM | SYSTOLIC BLOOD PRESSURE: 141 MMHG | OXYGEN SATURATION: 96 % | WEIGHT: 233.03 LBS | HEART RATE: 62 BPM

## 2022-10-24 DIAGNOSIS — Z98.890 OTHER SPECIFIED POSTPROCEDURAL STATES: Chronic | ICD-10-CM

## 2022-10-24 DIAGNOSIS — Z96.642 PRESENCE OF LEFT ARTIFICIAL HIP JOINT: Chronic | ICD-10-CM

## 2022-10-24 DIAGNOSIS — K40.90 UNILATERAL INGUINAL HERNIA, WITHOUT OBSTRUCTION OR GANGRENE, NOT SPECIFIED AS RECURRENT: ICD-10-CM

## 2022-10-24 PROCEDURE — 49650 LAP ING HERNIA REPAIR INIT: CPT | Mod: 50

## 2022-10-24 PROCEDURE — S2900 ROBOTIC SURGICAL SYSTEM: CPT

## 2022-10-24 PROCEDURE — C1781: CPT

## 2022-10-24 PROCEDURE — S2900: CPT

## 2022-10-24 DEVICE — MESH HERNIA INGUINAL PROGRIP LAPAROSCOPIC 15 X 10CM RIGHT: Type: IMPLANTABLE DEVICE | Status: FUNCTIONAL

## 2022-10-24 DEVICE — MESH HERNIA INGUINAL PROGRIP LAPAROSCOPIC 15 X 10CM LEFT: Type: IMPLANTABLE DEVICE | Status: FUNCTIONAL

## 2022-10-24 RX ORDER — METOPROLOL TARTRATE 50 MG
1 TABLET ORAL
Qty: 0 | Refills: 0 | DISCHARGE

## 2022-10-24 RX ORDER — MILK THISTLE 150 MG
1 CAPSULE ORAL
Qty: 0 | Refills: 0 | DISCHARGE

## 2022-10-24 RX ORDER — ACETAMINOPHEN 500 MG
2 TABLET ORAL
Qty: 0 | Refills: 0 | DISCHARGE

## 2022-10-24 RX ORDER — HYDROMORPHONE HYDROCHLORIDE 2 MG/ML
0.5 INJECTION INTRAMUSCULAR; INTRAVENOUS; SUBCUTANEOUS
Refills: 0 | Status: DISCONTINUED | OUTPATIENT
Start: 2022-10-24 | End: 2022-10-24

## 2022-10-24 RX ORDER — SODIUM CHLORIDE 9 MG/ML
1000 INJECTION, SOLUTION INTRAVENOUS
Refills: 0 | Status: DISCONTINUED | OUTPATIENT
Start: 2022-10-24 | End: 2022-10-24

## 2022-10-24 RX ORDER — ASPIRIN/CALCIUM CARB/MAGNESIUM 324 MG
1 TABLET ORAL
Qty: 0 | Refills: 0 | DISCHARGE

## 2022-10-24 RX ORDER — OXYCODONE HYDROCHLORIDE 5 MG/1
1 TABLET ORAL
Qty: 15 | Refills: 0
Start: 2022-10-24

## 2022-10-24 RX ORDER — CEFAZOLIN SODIUM 1 G
2000 VIAL (EA) INJECTION ONCE
Refills: 0 | Status: COMPLETED | OUTPATIENT
Start: 2022-10-24 | End: 2022-10-24

## 2022-10-24 RX ORDER — LORATADINE 10 MG/1
1 TABLET ORAL
Qty: 0 | Refills: 0 | DISCHARGE

## 2022-10-24 RX ORDER — LOSARTAN POTASSIUM 100 MG/1
1 TABLET, FILM COATED ORAL
Qty: 0 | Refills: 0 | DISCHARGE

## 2022-10-24 RX ORDER — AMLODIPINE BESYLATE 2.5 MG/1
1 TABLET ORAL
Qty: 0 | Refills: 0 | DISCHARGE

## 2022-10-24 RX ORDER — HYDRALAZINE HCL 50 MG
2 TABLET ORAL
Qty: 0 | Refills: 0 | DISCHARGE

## 2022-10-24 RX ORDER — ONDANSETRON 8 MG/1
4 TABLET, FILM COATED ORAL ONCE
Refills: 0 | Status: DISCONTINUED | OUTPATIENT
Start: 2022-10-24 | End: 2022-10-24

## 2022-10-24 RX ORDER — OXYCODONE HYDROCHLORIDE 5 MG/1
5 TABLET ORAL ONCE
Refills: 0 | Status: DISCONTINUED | OUTPATIENT
Start: 2022-10-24 | End: 2022-10-24

## 2022-10-24 RX ORDER — IBUPROFEN 200 MG
1 TABLET ORAL
Qty: 0 | Refills: 0 | DISCHARGE

## 2022-10-24 RX ADMIN — HYDROMORPHONE HYDROCHLORIDE 0.5 MILLIGRAM(S): 2 INJECTION INTRAMUSCULAR; INTRAVENOUS; SUBCUTANEOUS at 13:17

## 2022-10-24 RX ADMIN — SODIUM CHLORIDE 40 MILLILITER(S): 9 INJECTION, SOLUTION INTRAVENOUS at 09:01

## 2022-10-24 RX ADMIN — HYDROMORPHONE HYDROCHLORIDE 0.5 MILLIGRAM(S): 2 INJECTION INTRAMUSCULAR; INTRAVENOUS; SUBCUTANEOUS at 13:03

## 2022-10-24 RX ADMIN — HYDROMORPHONE HYDROCHLORIDE 0.5 MILLIGRAM(S): 2 INJECTION INTRAMUSCULAR; INTRAVENOUS; SUBCUTANEOUS at 12:50

## 2022-10-24 RX ADMIN — SODIUM CHLORIDE 75 MILLILITER(S): 9 INJECTION, SOLUTION INTRAVENOUS at 12:43

## 2022-10-24 NOTE — PRE-OP CHECKLIST - ANTIBIOTIC
yes Quality 402: Tobacco Use And Help With Quitting Among Adolescents: Patient screened for tobacco and never smoked Quality 226: Preventive Care And Screening: Tobacco Use: Screening And Cessation Intervention: Patient screened for tobacco use and is an ex/non-smoker Quality 431: Preventive Care And Screening: Unhealthy Alcohol Use - Screening: Patient screened for unhealthy alcohol use using a single question and scores less than 2 times per year Quality 110: Preventive Care And Screening: Influenza Immunization: Influenza Immunization previously received during influenza season Detail Level: Detailed Quality 130: Documentation Of Current Medications In The Medical Record: Current Medications Documented Quality 111:Pneumonia Vaccination Status For Older Adults: Pneumococcal Vaccination Previously Received Quality 431: Preventive Care And Screening: Unhealthy Alcohol Use - Screening: Patient not identified as an unhealthy alcohol user when screened for unhealthy alcohol use using a systematic screening method

## 2022-10-24 NOTE — ASU DISCHARGE PLAN (ADULT/PEDIATRIC) - NS MD DC FALL RISK RISK
For information on Fall & Injury Prevention, visit: [email or web address] OR  [email or web address] OR  https://www.cdc.gov/steadi/patient.html

## 2022-10-24 NOTE — BRIEF OPERATIVE NOTE - NSICDXBRIEFPOSTOP_GEN_ALL_CORE_FT
POST-OP DIAGNOSIS:  Left inguinal hernia 24-Oct-2022 12:31:46  Vineet Martinez  Right inguinal hernia 24-Oct-2022 12:31:56  Vineet Martinez

## 2022-10-24 NOTE — ASU DISCHARGE PLAN (ADULT/PEDIATRIC) - CARE PROVIDER_API CALL
Isaiah Chacon)  Surgery  [address] [place] [address]  Phone: [phone]  Fax: [phone]  Follow Up Time:

## 2022-10-24 NOTE — BRIEF OPERATIVE NOTE - NSICDXBRIEFPROCEDURE_GEN_ALL_CORE_FT
PROCEDURES:  Robot-assisted repair of inguinal hernia using da Marcella Xi 24-Oct-2022 12:31:09 BILATERAL Vineet Martinez

## 2022-11-01 ENCOUNTER — APPOINTMENT (OUTPATIENT)
Dept: SURGERY | Facility: CLINIC | Age: 57
End: 2022-11-01

## 2022-11-01 VITALS
WEIGHT: 234 LBS | HEART RATE: 66 BPM | TEMPERATURE: 97.5 F | DIASTOLIC BLOOD PRESSURE: 93 MMHG | OXYGEN SATURATION: 98 % | SYSTOLIC BLOOD PRESSURE: 151 MMHG | HEIGHT: 68 IN | BODY MASS INDEX: 35.46 KG/M2

## 2022-11-01 PROCEDURE — 99024 POSTOP FOLLOW-UP VISIT: CPT

## 2022-11-01 NOTE — ASSESSMENT
[FreeTextEntry1] : Postoperative follow-up status post robotic assisted laparoscopic repair of bilateral inguinal hernias.  Patient presents with some mild scrotal edema bilaterally.  No ecchymosis no induration no fluctuance.  No signs of infection.  Surgical incisions remain well approximated and healing appropriately.\par \par Postoperative instructions have been provided including avoidance of heavy lifting and strenuous activities in excess of 20-25 pounds for duration of 4-6 weeks. The patient may shower keeping the incisions clean, dry, covered as needed.\par \par May return to work in 1 week.\par \par Follow-up with me in 1 month

## 2022-11-01 NOTE — PHYSICAL EXAM
[Normal Breath Sounds] : Normal breath sounds [Normal Heart Sounds] : normal heart sounds [Normal Rate and Rhythm] : normal rate and rhythm [No Rash or Lesion] : No rash or lesion [Alert] : alert [Oriented to Person] : oriented to person [Oriented to Place] : oriented to place [Oriented to Time] : oriented to time [Calm] : calm [de-identified] : Obese gentleman in no distress [de-identified] : EMMANUEL HINES EOMI [de-identified] : Obese, soft, nontender, nondistended, positive bowel sounds in all four quadrants.  Surgical incisions well approximated and healing nicely.  no signs of regurrent hernia or masses. [de-identified] : Testes descended bilaterally, smooth and symmetrical. mild resolving scrotal edema [de-identified] : Ambulating without assistance

## 2022-12-06 ENCOUNTER — APPOINTMENT (OUTPATIENT)
Dept: SURGERY | Facility: CLINIC | Age: 57
End: 2022-12-06

## 2022-12-06 VITALS
TEMPERATURE: 97.3 F | SYSTOLIC BLOOD PRESSURE: 130 MMHG | WEIGHT: 235 LBS | OXYGEN SATURATION: 97 % | HEIGHT: 68 IN | HEART RATE: 69 BPM | DIASTOLIC BLOOD PRESSURE: 84 MMHG | BODY MASS INDEX: 35.61 KG/M2

## 2022-12-06 DIAGNOSIS — K40.20 BILATERAL INGUINAL HERNIA, W/OUT OBSTRUCTION OR GANGRENE, NOT SPECIFIED AS RECURRENT: ICD-10-CM

## 2022-12-06 PROCEDURE — 99024 POSTOP FOLLOW-UP VISIT: CPT

## 2022-12-06 NOTE — HISTORY OF PRESENT ILLNESS
[de-identified] : Routine follow-up status post robotic assisted laparoscopic repair of bilateral inguinal hernias.

## 2022-12-06 NOTE — PHYSICAL EXAM
[Normal Breath Sounds] : Normal breath sounds [Normal Heart Sounds] : normal heart sounds [Normal Rate and Rhythm] : normal rate and rhythm [No Rash or Lesion] : No rash or lesion [Alert] : alert [Oriented to Person] : oriented to person [Oriented to Place] : oriented to place [Oriented to Time] : oriented to time [Calm] : calm [de-identified] : Obese gentleman in no distress [de-identified] : EMMANUEL HINES EOMI [de-identified] : Obese, soft, nontender, nondistended, positive bowel sounds in all four quadrants.  Surgical incisions well approximated and healing nicely.  no signs of regurrent hernia or masses. [de-identified] : Testes descended bilaterally, smooth and symmetrical. mild resolving scrotal edema [de-identified] : Ambulating without assistance

## 2022-12-06 NOTE — ASSESSMENT
[FreeTextEntry1] : Postoperative follow-up 6-week status post robotic assisted laparoscopic repair of bilateral inguinal hernias.\par Healing well without new complaints.  No signs of recurrent hernia or masses.  Surgical incisions are healing nicely.  No signs of infection or postoperative complications.\par \par Patient is cleared to resume normal daily activities as tolerated.  No restrictions.\par \par No additional further surgical intervention is indicated.  Follow-up as needed

## 2022-12-11 ENCOUNTER — NON-APPOINTMENT (OUTPATIENT)
Age: 57
End: 2022-12-11

## 2023-01-25 ENCOUNTER — RX RENEWAL (OUTPATIENT)
Age: 58
End: 2023-01-25

## 2023-04-23 ENCOUNTER — RX RENEWAL (OUTPATIENT)
Age: 58
End: 2023-04-23

## 2023-04-25 ENCOUNTER — RX RENEWAL (OUTPATIENT)
Age: 58
End: 2023-04-25

## 2023-06-27 ENCOUNTER — RX RENEWAL (OUTPATIENT)
Age: 58
End: 2023-06-27

## 2023-10-06 ENCOUNTER — APPOINTMENT (OUTPATIENT)
Dept: INTERNAL MEDICINE | Facility: CLINIC | Age: 58
End: 2023-10-06
Payer: COMMERCIAL

## 2023-10-06 VITALS
RESPIRATION RATE: 14 BRPM | HEIGHT: 68 IN | SYSTOLIC BLOOD PRESSURE: 120 MMHG | WEIGHT: 231 LBS | OXYGEN SATURATION: 98 % | TEMPERATURE: 98.1 F | HEART RATE: 73 BPM | BODY MASS INDEX: 35.01 KG/M2 | DIASTOLIC BLOOD PRESSURE: 80 MMHG

## 2023-10-06 DIAGNOSIS — Z00.00 ENCOUNTER FOR GENERAL ADULT MEDICAL EXAMINATION W/OUT ABNORMAL FINDINGS: ICD-10-CM

## 2023-10-06 PROCEDURE — 99396 PREV VISIT EST AGE 40-64: CPT | Mod: 25

## 2023-10-09 LAB
ALBUMIN SERPL ELPH-MCNC: 4.2 G/DL
ALP BLD-CCNC: 94 U/L
ALT SERPL-CCNC: 25 U/L
ANION GAP SERPL CALC-SCNC: 11 MMOL/L
AST SERPL-CCNC: 25 U/L
BASOPHILS # BLD AUTO: 0.04 K/UL
BASOPHILS NFR BLD AUTO: 0.5 %
BILIRUB SERPL-MCNC: 0.6 MG/DL
BUN SERPL-MCNC: 17 MG/DL
CALCIUM SERPL-MCNC: 8.9 MG/DL
CHLORIDE SERPL-SCNC: 105 MMOL/L
CHOLEST SERPL-MCNC: 104 MG/DL
CO2 SERPL-SCNC: 26 MMOL/L
CREAT SERPL-MCNC: 0.96 MG/DL
EGFR: 92 ML/MIN/1.73M2
EOSINOPHIL # BLD AUTO: 0.11 K/UL
EOSINOPHIL NFR BLD AUTO: 1.3 %
ESTIMATED AVERAGE GLUCOSE: 108 MG/DL
GLUCOSE SERPL-MCNC: 100 MG/DL
HBA1C MFR BLD HPLC: 5.4 %
HCT VFR BLD CALC: 46.1 %
HDLC SERPL-MCNC: 45 MG/DL
HGB BLD-MCNC: 15.4 G/DL
IMM GRANULOCYTES NFR BLD AUTO: 0.1 %
LDLC SERPL CALC-MCNC: 45 MG/DL
LYMPHOCYTES # BLD AUTO: 1.67 K/UL
LYMPHOCYTES NFR BLD AUTO: 20.2 %
MAN DIFF?: NORMAL
MCHC RBC-ENTMCNC: 29.7 PG
MCHC RBC-ENTMCNC: 33.4 GM/DL
MCV RBC AUTO: 88.8 FL
MONOCYTES # BLD AUTO: 0.69 K/UL
MONOCYTES NFR BLD AUTO: 8.3 %
NEUTROPHILS # BLD AUTO: 5.75 K/UL
NEUTROPHILS NFR BLD AUTO: 69.6 %
NONHDLC SERPL-MCNC: 59 MG/DL
PLATELET # BLD AUTO: 255 K/UL
POTASSIUM SERPL-SCNC: 4 MMOL/L
PROT SERPL-MCNC: 7.3 G/DL
PSA SERPL-MCNC: 1.52 NG/ML
RBC # BLD: 5.19 M/UL
RBC # FLD: 13.4 %
SODIUM SERPL-SCNC: 142 MMOL/L
TRIGL SERPL-MCNC: 63 MG/DL
TSH SERPL-ACNC: 1.18 UIU/ML
WBC # FLD AUTO: 8.27 K/UL

## 2023-10-10 ENCOUNTER — RX RENEWAL (OUTPATIENT)
Age: 58
End: 2023-10-10

## 2023-10-13 ENCOUNTER — NON-APPOINTMENT (OUTPATIENT)
Age: 58
End: 2023-10-13

## 2023-10-13 ENCOUNTER — APPOINTMENT (OUTPATIENT)
Dept: CARDIOLOGY | Facility: CLINIC | Age: 58
End: 2023-10-13
Payer: COMMERCIAL

## 2023-10-13 VITALS
BODY MASS INDEX: 35.61 KG/M2 | HEART RATE: 73 BPM | SYSTOLIC BLOOD PRESSURE: 158 MMHG | WEIGHT: 235 LBS | OXYGEN SATURATION: 96 % | HEIGHT: 68 IN | DIASTOLIC BLOOD PRESSURE: 87 MMHG

## 2023-10-13 DIAGNOSIS — I42.9 CARDIOMYOPATHY, UNSPECIFIED: ICD-10-CM

## 2023-10-13 PROCEDURE — 99214 OFFICE O/P EST MOD 30 MIN: CPT | Mod: 25

## 2023-10-13 PROCEDURE — 93000 ELECTROCARDIOGRAM COMPLETE: CPT

## 2023-10-20 ENCOUNTER — APPOINTMENT (OUTPATIENT)
Dept: CARDIOLOGY | Facility: CLINIC | Age: 58
End: 2023-10-20
Payer: COMMERCIAL

## 2023-10-20 PROCEDURE — 93306 TTE W/DOPPLER COMPLETE: CPT

## 2023-11-16 ENCOUNTER — EMERGENCY (EMERGENCY)
Facility: HOSPITAL | Age: 58
LOS: 1 days | Discharge: HOME | End: 2023-11-16
Attending: EMERGENCY MEDICINE | Admitting: EMERGENCY MEDICINE
Payer: COMMERCIAL

## 2023-11-16 VITALS — HEART RATE: 55 BPM | DIASTOLIC BLOOD PRESSURE: 86 MMHG | SYSTOLIC BLOOD PRESSURE: 158 MMHG | RESPIRATION RATE: 16 BRPM

## 2023-11-16 VITALS
WEIGHT: 220.02 LBS | HEART RATE: 52 BPM | OXYGEN SATURATION: 98 % | RESPIRATION RATE: 18 BRPM | TEMPERATURE: 98 F | DIASTOLIC BLOOD PRESSURE: 111 MMHG | SYSTOLIC BLOOD PRESSURE: 168 MMHG

## 2023-11-16 DIAGNOSIS — Z98.890 OTHER SPECIFIED POSTPROCEDURAL STATES: Chronic | ICD-10-CM

## 2023-11-16 DIAGNOSIS — Z96.642 PRESENCE OF LEFT ARTIFICIAL HIP JOINT: Chronic | ICD-10-CM

## 2023-11-16 LAB
ALBUMIN SERPL ELPH-MCNC: 3.4 G/DL — SIGNIFICANT CHANGE UP (ref 3.3–5)
ALBUMIN SERPL ELPH-MCNC: 3.4 G/DL — SIGNIFICANT CHANGE UP (ref 3.3–5)
ALP SERPL-CCNC: 83 U/L — SIGNIFICANT CHANGE UP (ref 40–120)
ALP SERPL-CCNC: 83 U/L — SIGNIFICANT CHANGE UP (ref 40–120)
ALT FLD-CCNC: 25 U/L — SIGNIFICANT CHANGE UP (ref 12–78)
ALT FLD-CCNC: 25 U/L — SIGNIFICANT CHANGE UP (ref 12–78)
ANION GAP SERPL CALC-SCNC: 6 MMOL/L — SIGNIFICANT CHANGE UP (ref 5–17)
ANION GAP SERPL CALC-SCNC: 6 MMOL/L — SIGNIFICANT CHANGE UP (ref 5–17)
APTT BLD: 31 SEC — SIGNIFICANT CHANGE UP (ref 24.5–35.6)
APTT BLD: 31 SEC — SIGNIFICANT CHANGE UP (ref 24.5–35.6)
AST SERPL-CCNC: 20 U/L — SIGNIFICANT CHANGE UP (ref 15–37)
AST SERPL-CCNC: 20 U/L — SIGNIFICANT CHANGE UP (ref 15–37)
BASOPHILS # BLD AUTO: 0.05 K/UL — SIGNIFICANT CHANGE UP (ref 0–0.2)
BASOPHILS # BLD AUTO: 0.05 K/UL — SIGNIFICANT CHANGE UP (ref 0–0.2)
BASOPHILS NFR BLD AUTO: 0.5 % — SIGNIFICANT CHANGE UP (ref 0–2)
BASOPHILS NFR BLD AUTO: 0.5 % — SIGNIFICANT CHANGE UP (ref 0–2)
BILIRUB SERPL-MCNC: 0.6 MG/DL — SIGNIFICANT CHANGE UP (ref 0.2–1.2)
BILIRUB SERPL-MCNC: 0.6 MG/DL — SIGNIFICANT CHANGE UP (ref 0.2–1.2)
BUN SERPL-MCNC: 21 MG/DL — SIGNIFICANT CHANGE UP (ref 7–23)
BUN SERPL-MCNC: 21 MG/DL — SIGNIFICANT CHANGE UP (ref 7–23)
CALCIUM SERPL-MCNC: 8.6 MG/DL — SIGNIFICANT CHANGE UP (ref 8.5–10.1)
CALCIUM SERPL-MCNC: 8.6 MG/DL — SIGNIFICANT CHANGE UP (ref 8.5–10.1)
CHLORIDE SERPL-SCNC: 106 MMOL/L — SIGNIFICANT CHANGE UP (ref 96–108)
CHLORIDE SERPL-SCNC: 106 MMOL/L — SIGNIFICANT CHANGE UP (ref 96–108)
CO2 SERPL-SCNC: 28 MMOL/L — SIGNIFICANT CHANGE UP (ref 22–31)
CO2 SERPL-SCNC: 28 MMOL/L — SIGNIFICANT CHANGE UP (ref 22–31)
CREAT SERPL-MCNC: 0.82 MG/DL — SIGNIFICANT CHANGE UP (ref 0.5–1.3)
CREAT SERPL-MCNC: 0.82 MG/DL — SIGNIFICANT CHANGE UP (ref 0.5–1.3)
EGFR: 102 ML/MIN/1.73M2 — SIGNIFICANT CHANGE UP
EGFR: 102 ML/MIN/1.73M2 — SIGNIFICANT CHANGE UP
EOSINOPHIL # BLD AUTO: 0.11 K/UL — SIGNIFICANT CHANGE UP (ref 0–0.5)
EOSINOPHIL # BLD AUTO: 0.11 K/UL — SIGNIFICANT CHANGE UP (ref 0–0.5)
EOSINOPHIL NFR BLD AUTO: 1.1 % — SIGNIFICANT CHANGE UP (ref 0–6)
EOSINOPHIL NFR BLD AUTO: 1.1 % — SIGNIFICANT CHANGE UP (ref 0–6)
GLUCOSE SERPL-MCNC: 120 MG/DL — HIGH (ref 70–99)
GLUCOSE SERPL-MCNC: 120 MG/DL — HIGH (ref 70–99)
HCT VFR BLD CALC: 42.7 % — SIGNIFICANT CHANGE UP (ref 39–50)
HCT VFR BLD CALC: 42.7 % — SIGNIFICANT CHANGE UP (ref 39–50)
HGB BLD-MCNC: 14.5 G/DL — SIGNIFICANT CHANGE UP (ref 13–17)
HGB BLD-MCNC: 14.5 G/DL — SIGNIFICANT CHANGE UP (ref 13–17)
IMM GRANULOCYTES NFR BLD AUTO: 0.2 % — SIGNIFICANT CHANGE UP (ref 0–0.9)
IMM GRANULOCYTES NFR BLD AUTO: 0.2 % — SIGNIFICANT CHANGE UP (ref 0–0.9)
INR BLD: 1.03 RATIO — SIGNIFICANT CHANGE UP (ref 0.85–1.18)
INR BLD: 1.03 RATIO — SIGNIFICANT CHANGE UP (ref 0.85–1.18)
LYMPHOCYTES # BLD AUTO: 1.18 K/UL — SIGNIFICANT CHANGE UP (ref 1–3.3)
LYMPHOCYTES # BLD AUTO: 1.18 K/UL — SIGNIFICANT CHANGE UP (ref 1–3.3)
LYMPHOCYTES # BLD AUTO: 12 % — LOW (ref 13–44)
LYMPHOCYTES # BLD AUTO: 12 % — LOW (ref 13–44)
MCHC RBC-ENTMCNC: 30 PG — SIGNIFICANT CHANGE UP (ref 27–34)
MCHC RBC-ENTMCNC: 30 PG — SIGNIFICANT CHANGE UP (ref 27–34)
MCHC RBC-ENTMCNC: 34 GM/DL — SIGNIFICANT CHANGE UP (ref 32–36)
MCHC RBC-ENTMCNC: 34 GM/DL — SIGNIFICANT CHANGE UP (ref 32–36)
MCV RBC AUTO: 88.2 FL — SIGNIFICANT CHANGE UP (ref 80–100)
MCV RBC AUTO: 88.2 FL — SIGNIFICANT CHANGE UP (ref 80–100)
MONOCYTES # BLD AUTO: 0.78 K/UL — SIGNIFICANT CHANGE UP (ref 0–0.9)
MONOCYTES # BLD AUTO: 0.78 K/UL — SIGNIFICANT CHANGE UP (ref 0–0.9)
MONOCYTES NFR BLD AUTO: 7.9 % — SIGNIFICANT CHANGE UP (ref 2–14)
MONOCYTES NFR BLD AUTO: 7.9 % — SIGNIFICANT CHANGE UP (ref 2–14)
NEUTROPHILS # BLD AUTO: 7.7 K/UL — HIGH (ref 1.8–7.4)
NEUTROPHILS # BLD AUTO: 7.7 K/UL — HIGH (ref 1.8–7.4)
NEUTROPHILS NFR BLD AUTO: 78.3 % — HIGH (ref 43–77)
NEUTROPHILS NFR BLD AUTO: 78.3 % — HIGH (ref 43–77)
NRBC # BLD: 0 /100 WBCS — SIGNIFICANT CHANGE UP (ref 0–0)
NRBC # BLD: 0 /100 WBCS — SIGNIFICANT CHANGE UP (ref 0–0)
PLATELET # BLD AUTO: 190 K/UL — SIGNIFICANT CHANGE UP (ref 150–400)
PLATELET # BLD AUTO: 190 K/UL — SIGNIFICANT CHANGE UP (ref 150–400)
POTASSIUM SERPL-MCNC: 3.6 MMOL/L — SIGNIFICANT CHANGE UP (ref 3.5–5.3)
POTASSIUM SERPL-MCNC: 3.6 MMOL/L — SIGNIFICANT CHANGE UP (ref 3.5–5.3)
POTASSIUM SERPL-SCNC: 3.6 MMOL/L — SIGNIFICANT CHANGE UP (ref 3.5–5.3)
POTASSIUM SERPL-SCNC: 3.6 MMOL/L — SIGNIFICANT CHANGE UP (ref 3.5–5.3)
PROT SERPL-MCNC: 7.4 G/DL — SIGNIFICANT CHANGE UP (ref 6–8.3)
PROT SERPL-MCNC: 7.4 G/DL — SIGNIFICANT CHANGE UP (ref 6–8.3)
PROTHROM AB SERPL-ACNC: 12 SEC — SIGNIFICANT CHANGE UP (ref 9.5–13)
PROTHROM AB SERPL-ACNC: 12 SEC — SIGNIFICANT CHANGE UP (ref 9.5–13)
RBC # BLD: 4.84 M/UL — SIGNIFICANT CHANGE UP (ref 4.2–5.8)
RBC # BLD: 4.84 M/UL — SIGNIFICANT CHANGE UP (ref 4.2–5.8)
RBC # FLD: 13.2 % — SIGNIFICANT CHANGE UP (ref 10.3–14.5)
RBC # FLD: 13.2 % — SIGNIFICANT CHANGE UP (ref 10.3–14.5)
SODIUM SERPL-SCNC: 140 MMOL/L — SIGNIFICANT CHANGE UP (ref 135–145)
SODIUM SERPL-SCNC: 140 MMOL/L — SIGNIFICANT CHANGE UP (ref 135–145)
TROPONIN I, HIGH SENSITIVITY RESULT: 12.1 NG/L — SIGNIFICANT CHANGE UP
TROPONIN I, HIGH SENSITIVITY RESULT: 12.1 NG/L — SIGNIFICANT CHANGE UP
WBC # BLD: 9.84 K/UL — SIGNIFICANT CHANGE UP (ref 3.8–10.5)
WBC # BLD: 9.84 K/UL — SIGNIFICANT CHANGE UP (ref 3.8–10.5)
WBC # FLD AUTO: 9.84 K/UL — SIGNIFICANT CHANGE UP (ref 3.8–10.5)
WBC # FLD AUTO: 9.84 K/UL — SIGNIFICANT CHANGE UP (ref 3.8–10.5)

## 2023-11-16 PROCEDURE — 96365 THER/PROPH/DIAG IV INF INIT: CPT | Mod: XU

## 2023-11-16 PROCEDURE — 84484 ASSAY OF TROPONIN QUANT: CPT

## 2023-11-16 PROCEDURE — 85610 PROTHROMBIN TIME: CPT

## 2023-11-16 PROCEDURE — 99285 EMERGENCY DEPT VISIT HI MDM: CPT

## 2023-11-16 PROCEDURE — 36415 COLL VENOUS BLD VENIPUNCTURE: CPT

## 2023-11-16 PROCEDURE — 85025 COMPLETE CBC W/AUTO DIFF WBC: CPT

## 2023-11-16 PROCEDURE — 85730 THROMBOPLASTIN TIME PARTIAL: CPT

## 2023-11-16 PROCEDURE — 70450 CT HEAD/BRAIN W/O DYE: CPT | Mod: 26,59,MA

## 2023-11-16 PROCEDURE — 70498 CT ANGIOGRAPHY NECK: CPT | Mod: 26,MA

## 2023-11-16 PROCEDURE — 70496 CT ANGIOGRAPHY HEAD: CPT | Mod: MA

## 2023-11-16 PROCEDURE — 71045 X-RAY EXAM CHEST 1 VIEW: CPT | Mod: 26

## 2023-11-16 PROCEDURE — 71045 X-RAY EXAM CHEST 1 VIEW: CPT

## 2023-11-16 PROCEDURE — 70496 CT ANGIOGRAPHY HEAD: CPT | Mod: 26,MA

## 2023-11-16 PROCEDURE — 70450 CT HEAD/BRAIN W/O DYE: CPT | Mod: MA

## 2023-11-16 PROCEDURE — 80053 COMPREHEN METABOLIC PANEL: CPT

## 2023-11-16 PROCEDURE — 70498 CT ANGIOGRAPHY NECK: CPT | Mod: MA

## 2023-11-16 PROCEDURE — 93005 ELECTROCARDIOGRAM TRACING: CPT

## 2023-11-16 PROCEDURE — 93010 ELECTROCARDIOGRAM REPORT: CPT

## 2023-11-16 PROCEDURE — 99284 EMERGENCY DEPT VISIT MOD MDM: CPT

## 2023-11-16 PROCEDURE — 99285 EMERGENCY DEPT VISIT HI MDM: CPT | Mod: 25

## 2023-11-16 RX ORDER — AMLODIPINE BESYLATE 2.5 MG/1
1 TABLET ORAL
Qty: 21 | Refills: 0
Start: 2023-11-16 | End: 2023-12-06

## 2023-11-16 RX ORDER — ACETAMINOPHEN 500 MG
1000 TABLET ORAL ONCE
Refills: 0 | Status: COMPLETED | OUTPATIENT
Start: 2023-11-16 | End: 2023-11-16

## 2023-11-16 RX ADMIN — Medication 400 MILLIGRAM(S): at 14:15

## 2023-11-16 RX ADMIN — Medication 1000 MILLIGRAM(S): at 15:42

## 2023-11-16 RX ADMIN — Medication 1000 MILLIGRAM(S): at 14:42

## 2023-11-16 NOTE — CONSULT NOTE ADULT - NS ATTEND AMEND GEN_ALL_CORE FT
HA in the setting of elevated BP  BP has been much worse since changing toprol to coreg, and stopping amlodipine (gum swelling)  no sign of acute ischemia or volume overload  after long discussion, decided to change coreg back to toprol 100, and restart amlodipine 5  CTs are negative  dc home and outpt follow up in 2 weeks

## 2023-11-16 NOTE — ED PROVIDER NOTE - PROGRESS NOTE DETAILS
Lizett (doug) seen eval pt, cleared for d/c with amlodipine 5mg and f/u as outpt.  Reevaluated patient at bedside.  Patient feeling much improved.  Discussed the results of all diagnostic testing in ED and copies of all reports given.   An opportunity to ask questions was given.  Discussed the importance of prompt, close medical follow-up.  Patient will return with any changes, concerns or persistent / worsening symptoms.  Understanding of all instructions verbalized.

## 2023-11-16 NOTE — CONSULT NOTE ADULT - ASSESSMENT
57 year old male with pmh hypertension, myopericarditis, varicose veins, hyperlipidemia,  pancreatitis,  cardiac catheterization in 2009 normal coronary arteries and mild to moderate global left ventricular dysfunction with an ejection fraction of 40%,  small acute left corona radiata infarct , recently stopped norvasc 2/2 gum inflammation he is followed by Dr Kaufman. now presenting to ed found with elevated blood pressure.   Patient had headache at home found with bp 210/110 now presenting to ed for further evaluation.     EKG pending  continue home asa, statin   Ct head pending     bp elevated at home 210/110, now 175/87 in ed   repeat   on home coreg, hydralazine and losartan     Echocardiogram in 10/22 showed an EF 62%, mild aortic root dilation  no volume overload on exam,   chest xray pending     Monitor and replete electrolytes. Keep K>4.0 and Mg>2.0.  all labs pending at time of consult     Daniela SINGERP-C  Cardiology NP  call teams     57 year old male with pmh hypertension, myopericarditis, varicose veins, hyperlipidemia,  pancreatitis,  cardiac catheterization in 2009 normal coronary arteries and mild to moderate global left ventricular dysfunction with an ejection fraction of 40%,  small acute left corona radiata infarct , recently stopped norvasc 2/2 gum inflammation he is followed by Dr Kaufman. now presenting to ed found with elevated blood pressure.   Patient had headache at home found with bp 210/110 now presenting to ed for further evaluation.     EKG Sb 51   continue home asa, statin   Ct head pending     bp elevated at home 210/110, now 175/87 in ed   on home coreg, hydralazine and losartan   increase hydralazine to 100mg TID     Echocardiogram in 10/22 showed an EF 62%, mild aortic root dilation  no volume overload on exam,   chest xray pending     Monitor and replete electrolytes. Keep K>4.0 and Mg>2.0.  all labs pending at time of consult     Daniela SINGERP-C  Cardiology NP  call teams     57 year old male with pmh hypertension, myopericarditis, varicose veins, hyperlipidemia,  pancreatitis,  cardiac catheterization in 2009 normal coronary arteries and mild to moderate global left ventricular dysfunction with an ejection fraction of 40%,  small acute left corona radiata infarct , recently stopped norvasc 2/2 gum inflammation he is followed by Dr Kaufman. now presenting to ed found with elevated blood pressure.   Patient had headache at home found with bp 210/110 now presenting to ed for further evaluation.     EKG SB 51   continue home asa, statin   Ct head pending     bp elevated at home 210/110, now 175/87 in ed   on home coreg, hydralazine and losartan     Echocardiogram in 10/22 showed an EF 62%, mild aortic root dilation  no volume overload on exam,   chest xray pending     Monitor and replete electrolytes. Keep K>4.0 and Mg>2.0.  all labs pending at time of consult     Daniela SINGERP-C  Cardiology NP  call teams

## 2023-11-16 NOTE — ED PROVIDER NOTE - CARE PROVIDER_API CALL
Baljeet Kaufman  Cardiovascular Disease  [address] [place] [address]  Phone: [phone]  Fax: [phone]  Follow Up Time: 1-3 Days

## 2023-11-16 NOTE — ED PROVIDER NOTE - OBJECTIVE STATEMENT
Patient complaining of headache associated with elevated blood pressure palpitations shortness of breath.  Patient relates BP when he checked it at home was elevated (highest 210/110).  Patient relates he took 2 Motrin and his headache is improving but has not fully resolved.  Patient no longer feeling short of breath or palpitations.  Patient called his cardiologist Dr. Kaufman in the office told him to come to the ER.  No fevers chills weakness numbness dizziness nausea vomiting chest pain abdominal pain.  PMD [facility]  Cardiology Mandeep

## 2023-11-16 NOTE — ED ADULT NURSE NOTE - NS ED NURSE DISCH DISPOSITION
Pt called to refill lisinopril-hydroCHLOROthiazide 20-25 MG Oral Tab. Pt has 6 pills left, but is going out out of the country on Monday 12/12/2022. Pt asking if meds can be filled prior to trip as he is not coming back until 1st week of January. LOV: 05/28/2022    Pt was instructed to schedul OV in November for 6 mo f/u.  Pt unable to schedule f/u until back in country Discharged

## 2023-11-16 NOTE — ED PROVIDER NOTE - CLINICAL SUMMARY MEDICAL DECISION MAKING FREE TEXT BOX
Patient complaining of headache associated with elevated blood pressure palpitations shortness of breath.  Patient relates BP when he checked it at home was elevated (highest 210/110).  Patient relates he took 2 Motrin and his headache is improving but has not fully resolved.  Patient no longer feeling short of breath or palpitations.  Patient called his cardiologist Dr. Kaufman in the office told him to come to the ER.  No fevers chills weakness numbness dizziness nausea vomiting chest pain abdominal pain.  PMD [facility]  Cardiology Mandepe    Plan EKG CT head CTA head and neck chest x-ray labs [facility] cardiology consult    Differential including but not limited to ICH mass MI hypertensive urgency

## 2023-11-16 NOTE — ED PROVIDER NOTE - PATIENT PORTAL LINK FT
You can access the FollowMyHealth Patient Portal offered by [facility] by registering at the following website: [email or web address]. By joining [facility]’s FollowMyHealth portal, you will also be able to view your health information using other applications (apps) compatible with our system.

## 2023-12-06 ENCOUNTER — NON-APPOINTMENT (OUTPATIENT)
Age: 58
End: 2023-12-06

## 2023-12-06 ENCOUNTER — APPOINTMENT (OUTPATIENT)
Dept: CARDIOLOGY | Facility: CLINIC | Age: 58
End: 2023-12-06
Payer: COMMERCIAL

## 2023-12-06 PROCEDURE — 99214 OFFICE O/P EST MOD 30 MIN: CPT | Mod: 25

## 2023-12-06 PROCEDURE — 93000 ELECTROCARDIOGRAM COMPLETE: CPT

## 2023-12-06 RX ORDER — CARVEDILOL 25 MG/1
25 TABLET, FILM COATED ORAL
Qty: 180 | Refills: 3 | Status: DISCONTINUED | COMMUNITY
Start: 2023-10-13 | End: 2023-12-06

## 2023-12-14 ENCOUNTER — RX RENEWAL (OUTPATIENT)
Age: 58
End: 2023-12-14

## 2024-01-02 ENCOUNTER — APPOINTMENT (OUTPATIENT)
Dept: NEUROLOGY | Facility: CLINIC | Age: 59
End: 2024-01-02
Payer: COMMERCIAL

## 2024-01-02 ENCOUNTER — NON-APPOINTMENT (OUTPATIENT)
Age: 59
End: 2024-01-02

## 2024-01-02 PROCEDURE — 93886 INTRACRANIAL COMPLETE STUDY: CPT

## 2024-01-02 PROCEDURE — 93880 EXTRACRANIAL BILAT STUDY: CPT

## 2024-01-04 ENCOUNTER — RX RENEWAL (OUTPATIENT)
Age: 59
End: 2024-01-04

## 2024-01-04 RX ORDER — AMLODIPINE BESYLATE 5 MG/1
5 TABLET ORAL
Qty: 90 | Refills: 3 | Status: ACTIVE | COMMUNITY
Start: 2023-11-17

## 2024-01-10 ENCOUNTER — APPOINTMENT (OUTPATIENT)
Dept: NEUROLOGY | Facility: CLINIC | Age: 59
End: 2024-01-10
Payer: COMMERCIAL

## 2024-01-10 VITALS
HEIGHT: 68 IN | SYSTOLIC BLOOD PRESSURE: 143 MMHG | BODY MASS INDEX: 34.1 KG/M2 | WEIGHT: 225 LBS | HEART RATE: 51 BPM | DIASTOLIC BLOOD PRESSURE: 81 MMHG

## 2024-01-10 DIAGNOSIS — I63.9 CEREBRAL INFARCTION, UNSPECIFIED: ICD-10-CM

## 2024-01-10 PROCEDURE — 99204 OFFICE O/P NEW MOD 45 MIN: CPT

## 2024-01-10 NOTE — DISCUSSION/SUMMARY
[FreeTextEntry1] : Overall he is neurologically intact.  To summarize, in 2011 he developed right hemiparesis d/t an acute left corona radiata infarct most likely due to small vessel disease.  Carotid Doppler on 1/2/24 demonstrated mild right ICA stenosis Thyroid nodules were incidentally detected; I will defer to Dr. Marsh regarding whether this finding warrants further investigation.  He should continue to take Aspirin 81mg daily for secondary stroke prevention.  He should benefit from aggressive vascular risk factor control: Continue on statin with goal LDL <70, maintain blood pressure in normotension range, eat healthfully, and exercise as tolerated. Continue to treat obstructive sleep apnea.  He can follow up in 9-12 months with repeat Dopplers. I hope he remains free of further serious trouble. [Antithrombotic therapy with ___] : antithrombotic therapy with  [unfilled] [Intensive Blood Pressure Control] : intensive blood pressure control [Lipid Lowering Therapy] : lipid lowering therapy [Patient encouraged to discuss with Primary MD] : I encouraged the patient to discuss these important issues with ~his/her~ primary care doctor [Goals and Counseling] : I have reviewed the goals of stroke risk factor modification. I counseled the patient on measures to reduce stroke risk, including the importance of medication compliance, risk factor control, exercise, healthy diet and avoidance of smoking. I reviewed stroke warning signs and symptoms and appropriate actions to take if such occur.

## 2024-01-10 NOTE — PHYSICAL EXAM
[FreeTextEntry1] :  Neurologic examination:  Mental status:  The patient is alert, attentive, and oriented. Speech is clear and fluent with good  comprehension.  Cranial nerves:  CN II: Visual fields are full to confrontation.   CN III, IV, VI: At primary gaze, there is no eye deviation.EOMI. No ptosis  CN V: Facial sensation is intact bilaterally.  CN VII: Face is symmetric with normal eye closure and smile.  CN VII: Hearing is grossly intact.  CN IX, X: Palate elevates symmetrically. Phonation is normal.  CN XI: Head turning and shoulder shrug are intact  CN XII: Tongue is midline with normal movements and no atrophy.  Motor:  There is no pronator drift of out-stretched arms. Muscle bulk and tone are normal. Strength is full bilaterally.  Sensory:  Light touch intact in fingers and toes.  Coordination:  Fine finger movements are intact. There is no dysmetria on finger-to-nose.   Gait/Stance:  Gait and tandem gait are normal. Romberg is absent. [General Appearance - Alert] : alert [General Appearance - In No Acute Distress] : in no acute distress [Oriented To Time, Place, And Person] : oriented to person, place, and time [Impaired Insight] : insight and judgment were intact [Affect] : the affect was normal [Sclera] : the sclera and conjunctiva were normal [Extraocular Movements] : extraocular movements were intact [Full Visual Field] : full visual field [Outer Ear] : the ears and nose were normal in appearance [Examination Of The Oral Cavity] : the lips and gums were normal [Neck Appearance] : the appearance of the neck was normal [Auscultation Breath Sounds / Voice Sounds] : lungs were clear to auscultation bilaterally [Heart Sounds] : normal S1 and S2 [Abnormal Walk] : normal gait [Nail Clubbing] : no clubbing  or cyanosis of the fingernails [Motor Tone] : muscle strength and tone were normal [Musculoskeletal - Swelling] : no joint swelling seen [Skin Color & Pigmentation] : normal skin color and pigmentation [Skin Turgor] : normal skin turgor [] : no rash

## 2024-01-10 NOTE — REVIEW OF SYSTEMS
[As Noted in HPI] : as noted in HPI [Negative] : Heme/Lymph [FreeTextEntry2] : JUAN PABLO, uses CPAP

## 2024-01-16 ENCOUNTER — NON-APPOINTMENT (OUTPATIENT)
Age: 59
End: 2024-01-16

## 2024-01-16 ENCOUNTER — APPOINTMENT (OUTPATIENT)
Dept: CARDIOLOGY | Facility: CLINIC | Age: 59
End: 2024-01-16
Payer: COMMERCIAL

## 2024-01-16 VITALS
WEIGHT: 222 LBS | DIASTOLIC BLOOD PRESSURE: 80 MMHG | HEIGHT: 68 IN | HEART RATE: 55 BPM | BODY MASS INDEX: 33.65 KG/M2 | OXYGEN SATURATION: 96 % | SYSTOLIC BLOOD PRESSURE: 143 MMHG

## 2024-01-16 VITALS — DIASTOLIC BLOOD PRESSURE: 78 MMHG | SYSTOLIC BLOOD PRESSURE: 132 MMHG

## 2024-01-16 DIAGNOSIS — E04.1 NONTOXIC SINGLE THYROID NODULE: ICD-10-CM

## 2024-01-16 DIAGNOSIS — I10 ESSENTIAL (PRIMARY) HYPERTENSION: ICD-10-CM

## 2024-01-16 PROCEDURE — 99214 OFFICE O/P EST MOD 30 MIN: CPT | Mod: 25

## 2024-01-16 PROCEDURE — 93000 ELECTROCARDIOGRAM COMPLETE: CPT

## 2024-01-16 NOTE — HISTORY OF PRESENT ILLNESS
[FreeTextEntry1] : Mr. Leary returns for evaluation of hypertension, hyperlipidemia, and history of mild left ventricular dysfunction. Past medical history is remarkable for left THR 3/17, hypertension, myopericarditis, varicose veins, hyperlipidemia, stroke as described, pancreatitis as described, and cardiac catheterization in 2009 at which time he was noted to have normal coronary arteries and mild to moderate global left ventricular dysfunction with an ejection fraction of 40%.  I last saw him in 12/23.  By way of review, in 2012, he presented with right-sided weakness and numbness with evaluation revealing a small acute left corona radiata infarct. In April of 2012, he was hospitalized with pancreatitis felt to be possibly secondary to hydrochlorothiazide. He has been feeling well and does cardio exercise. He reports no chest pain, dyspnea, palpitations, lightheadedness, or syncope  In 2021, he had a full cardiac evaluation.  An exercise stress test was suboptimal, while he was on beta-blockers, and only got his heart rate up to 62% of max predicted at over 9 minutes of exercise.  Carotid Doppler with minimal atherosclerosis bilaterally.  Echocardiogram with normal left ventricular systolic function, and mildly dilated proximal ascending aorta at 4.1 cm, along with diastolic dysfunction.  Today, he is doing well. When I last saw him, I stopped his amlodipine because of gum inflammation, and his blood pressures have been notably elevated since.  His metoprolol was switched to Coreg, which has not helped with his blood pressures.  He went to the emergency room on 11/16/2023 with a headache and elevated pressure, and we changed his medications back. A repeat echocardiogram in 2023 demonstrated normal LV function, with mild aortic root dilatation. LDL was 45. He has no chest pain, difficulty breathing, or palpitations. His SBP has been in the 130 range.

## 2024-01-16 NOTE — DISCUSSION/SUMMARY
[FreeTextEntry1] : From a cardiovascular standpoint, Benjamin is feeling well.  His blood pressure is notably better, though he would like to come down on medication.   He will remain on losartan 100, amlodipine 5, toprol 50 bid, and hydralazine 100 tid. I am going to set up for a 24 hour ambulatory BP monitor to evaluate his trend at home before altering his regimen.  His LV function is normal and his aortic root does not appear to have enlarged.  He will continue to eat right and exercise.  He will use his CPAP for obstructive apnea. he will have a thyroid sonogram to re-evaluate previously seen nodules. I will see him again in 3-6 months.  [EKG obtained to assist in diagnosis and management of assessed problem(s)] : EKG obtained to assist in diagnosis and management of assessed problem(s)

## 2024-01-19 ENCOUNTER — APPOINTMENT (OUTPATIENT)
Dept: CARDIOLOGY | Facility: CLINIC | Age: 59
End: 2024-01-19

## 2024-01-25 ENCOUNTER — APPOINTMENT (OUTPATIENT)
Dept: ULTRASOUND IMAGING | Facility: CLINIC | Age: 59
End: 2024-01-25
Payer: COMMERCIAL

## 2024-01-25 ENCOUNTER — OUTPATIENT (OUTPATIENT)
Dept: OUTPATIENT SERVICES | Facility: HOSPITAL | Age: 59
LOS: 1 days | End: 2024-01-25
Payer: COMMERCIAL

## 2024-01-25 DIAGNOSIS — Z98.890 OTHER SPECIFIED POSTPROCEDURAL STATES: Chronic | ICD-10-CM

## 2024-01-25 DIAGNOSIS — E04.1 NONTOXIC SINGLE THYROID NODULE: ICD-10-CM

## 2024-01-25 DIAGNOSIS — Z96.642 PRESENCE OF LEFT ARTIFICIAL HIP JOINT: Chronic | ICD-10-CM

## 2024-01-25 PROCEDURE — 76536 US EXAM OF HEAD AND NECK: CPT | Mod: 26

## 2024-01-25 PROCEDURE — 76536 US EXAM OF HEAD AND NECK: CPT

## 2024-02-14 RX ORDER — METOPROLOL SUCCINATE 50 MG/1
50 TABLET, EXTENDED RELEASE ORAL
Qty: 180 | Refills: 2 | Status: ACTIVE | COMMUNITY
Start: 2023-11-17

## 2024-02-16 NOTE — HISTORY OF PRESENT ILLNESS
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

## 2024-03-05 ENCOUNTER — RX RENEWAL (OUTPATIENT)
Age: 59
End: 2024-03-05

## 2024-04-01 ENCOUNTER — RX RENEWAL (OUTPATIENT)
Age: 59
End: 2024-04-01

## 2024-04-10 RX ORDER — HYDRALAZINE HYDROCHLORIDE 50 MG/1
50 TABLET ORAL 3 TIMES DAILY
Qty: 270 | Refills: 3 | Status: ACTIVE | COMMUNITY
Start: 2022-10-18

## 2024-06-12 ENCOUNTER — NON-APPOINTMENT (OUTPATIENT)
Age: 59
End: 2024-06-12

## 2024-06-14 ENCOUNTER — APPOINTMENT (OUTPATIENT)
Dept: UROLOGY | Facility: CLINIC | Age: 59
End: 2024-06-14
Payer: COMMERCIAL

## 2024-06-14 VITALS — SYSTOLIC BLOOD PRESSURE: 132 MMHG | DIASTOLIC BLOOD PRESSURE: 88 MMHG | OXYGEN SATURATION: 96 % | HEART RATE: 60 BPM

## 2024-06-14 DIAGNOSIS — R35.1 NOCTURIA: ICD-10-CM

## 2024-06-14 DIAGNOSIS — R35.0 FREQUENCY OF MICTURITION: ICD-10-CM

## 2024-06-14 DIAGNOSIS — N43.3 HYDROCELE, UNSPECIFIED: ICD-10-CM

## 2024-06-14 PROCEDURE — 99214 OFFICE O/P EST MOD 30 MIN: CPT

## 2024-06-14 NOTE — HISTORY OF PRESENT ILLNESS
[FreeTextEntry1] : 58 year old male presents to office with c/o painless left testicular swelling. He states that swelling started a few months ago. Patient reports he underwent Sx for inguinal hernia repair 10/2022. He reached out to prior surgeon who advised him to follow up with urologist.

## 2024-06-14 NOTE — REVIEW OF SYSTEMS
[Testicular Pain] : testicular pain [Wake up at night to urinate  How many times?  ___] : wakes up to urinate [unfilled] times during the night [Negative] : Endocrine

## 2024-06-14 NOTE — PHYSICAL EXAM
[Testes Tenderness] : no tenderness of the testes [TextEntry] : Left larger than right testicular swelling- B/L hydrocele

## 2024-06-19 ENCOUNTER — OUTPATIENT (OUTPATIENT)
Dept: OUTPATIENT SERVICES | Facility: HOSPITAL | Age: 59
LOS: 1 days | End: 2024-06-19
Payer: COMMERCIAL

## 2024-06-19 ENCOUNTER — APPOINTMENT (OUTPATIENT)
Dept: ULTRASOUND IMAGING | Facility: CLINIC | Age: 59
End: 2024-06-19
Payer: COMMERCIAL

## 2024-06-19 DIAGNOSIS — N43.3 HYDROCELE, UNSPECIFIED: ICD-10-CM

## 2024-06-19 DIAGNOSIS — Z98.890 OTHER SPECIFIED POSTPROCEDURAL STATES: Chronic | ICD-10-CM

## 2024-06-19 DIAGNOSIS — Z96.642 PRESENCE OF LEFT ARTIFICIAL HIP JOINT: Chronic | ICD-10-CM

## 2024-06-19 PROCEDURE — 93975 VASCULAR STUDY: CPT

## 2024-06-19 PROCEDURE — 93975 VASCULAR STUDY: CPT | Mod: 26

## 2024-06-21 ENCOUNTER — RX RENEWAL (OUTPATIENT)
Age: 59
End: 2024-06-21

## 2024-06-21 ENCOUNTER — APPOINTMENT (OUTPATIENT)
Dept: UROLOGY | Facility: CLINIC | Age: 59
End: 2024-06-21
Payer: COMMERCIAL

## 2024-06-21 DIAGNOSIS — N43.3 HYDROCELE, UNSPECIFIED: ICD-10-CM

## 2024-06-21 PROCEDURE — 99214 OFFICE O/P EST MOD 30 MIN: CPT

## 2024-08-24 ENCOUNTER — NON-APPOINTMENT (OUTPATIENT)
Age: 59
End: 2024-08-24

## 2024-09-12 ENCOUNTER — APPOINTMENT (OUTPATIENT)
Dept: UROLOGY | Facility: HOSPITAL | Age: 59
End: 2024-09-12

## 2024-09-24 ENCOUNTER — RX RENEWAL (OUTPATIENT)
Age: 59
End: 2024-09-24

## 2024-09-28 LAB
ALBUMIN SERPL ELPH-MCNC: 4.1 G/DL
ALP BLD-CCNC: 85 U/L
ALT SERPL-CCNC: 17 U/L
ANION GAP SERPL CALC-SCNC: 10 MMOL/L
AST SERPL-CCNC: 19 U/L
BASOPHILS # BLD AUTO: 0.04 K/UL
BASOPHILS NFR BLD AUTO: 0.5 %
BILIRUB SERPL-MCNC: 0.6 MG/DL
BUN SERPL-MCNC: 20 MG/DL
CALCIUM SERPL-MCNC: 8.8 MG/DL
CHLORIDE SERPL-SCNC: 105 MMOL/L
CHOLEST SERPL-MCNC: 113 MG/DL
CO2 SERPL-SCNC: 27 MMOL/L
CREAT SERPL-MCNC: 0.95 MG/DL
EGFR: 92 ML/MIN/1.73M2
EOSINOPHIL # BLD AUTO: 0.1 K/UL
EOSINOPHIL NFR BLD AUTO: 1.4 %
ESTIMATED AVERAGE GLUCOSE: 111 MG/DL
GLUCOSE SERPL-MCNC: 106 MG/DL
HBA1C MFR BLD HPLC: 5.5 %
HCT VFR BLD CALC: 43.1 %
HDLC SERPL-MCNC: 49 MG/DL
HGB BLD-MCNC: 14.7 G/DL
IMM GRANULOCYTES NFR BLD AUTO: 0.1 %
LDLC SERPL CALC-MCNC: 51 MG/DL
LYMPHOCYTES # BLD AUTO: 1.8 K/UL
LYMPHOCYTES NFR BLD AUTO: 24.5 %
MAN DIFF?: NORMAL
MCHC RBC-ENTMCNC: 29.7 PG
MCHC RBC-ENTMCNC: 34.1 GM/DL
MCV RBC AUTO: 87.1 FL
MONOCYTES # BLD AUTO: 0.71 K/UL
MONOCYTES NFR BLD AUTO: 9.6 %
NEUTROPHILS # BLD AUTO: 4.7 K/UL
NEUTROPHILS NFR BLD AUTO: 63.9 %
NONHDLC SERPL-MCNC: 64 MG/DL
PLATELET # BLD AUTO: 222 K/UL
POTASSIUM SERPL-SCNC: 3.7 MMOL/L
PROT SERPL-MCNC: 7.2 G/DL
RBC # BLD: 4.95 M/UL
RBC # FLD: 13.9 %
SODIUM SERPL-SCNC: 142 MMOL/L
TRIGL SERPL-MCNC: 62 MG/DL
TSH SERPL-ACNC: 0.94 UIU/ML
WBC # FLD AUTO: 7.36 K/UL

## 2024-10-09 ENCOUNTER — APPOINTMENT (OUTPATIENT)
Dept: INTERNAL MEDICINE | Facility: CLINIC | Age: 59
End: 2024-10-09
Payer: COMMERCIAL

## 2024-10-09 VITALS
BODY MASS INDEX: 33.8 KG/M2 | WEIGHT: 223 LBS | TEMPERATURE: 97.6 F | OXYGEN SATURATION: 96 % | SYSTOLIC BLOOD PRESSURE: 180 MMHG | RESPIRATION RATE: 15 BRPM | HEART RATE: 67 BPM | HEIGHT: 68 IN | DIASTOLIC BLOOD PRESSURE: 100 MMHG

## 2024-10-09 DIAGNOSIS — Z00.00 ENCOUNTER FOR GENERAL ADULT MEDICAL EXAMINATION W/OUT ABNORMAL FINDINGS: ICD-10-CM

## 2024-10-09 PROCEDURE — 99396 PREV VISIT EST AGE 40-64: CPT

## 2024-10-10 LAB — PSA SERPL-MCNC: 2.31 NG/ML

## 2024-10-11 ENCOUNTER — NON-APPOINTMENT (OUTPATIENT)
Age: 59
End: 2024-10-11

## 2024-10-11 ENCOUNTER — APPOINTMENT (OUTPATIENT)
Dept: CARDIOLOGY | Facility: CLINIC | Age: 59
End: 2024-10-11
Payer: COMMERCIAL

## 2024-10-11 VITALS
SYSTOLIC BLOOD PRESSURE: 157 MMHG | DIASTOLIC BLOOD PRESSURE: 92 MMHG | HEIGHT: 68 IN | HEART RATE: 63 BPM | BODY MASS INDEX: 33.34 KG/M2 | WEIGHT: 220 LBS | OXYGEN SATURATION: 95 %

## 2024-10-11 VITALS — SYSTOLIC BLOOD PRESSURE: 148 MMHG | DIASTOLIC BLOOD PRESSURE: 82 MMHG

## 2024-10-11 DIAGNOSIS — I10 ESSENTIAL (PRIMARY) HYPERTENSION: ICD-10-CM

## 2024-10-11 PROCEDURE — 93000 ELECTROCARDIOGRAM COMPLETE: CPT

## 2024-10-11 PROCEDURE — 99214 OFFICE O/P EST MOD 30 MIN: CPT | Mod: 25

## 2024-10-14 ENCOUNTER — APPOINTMENT (OUTPATIENT)
Dept: UROLOGY | Facility: CLINIC | Age: 59
End: 2024-10-14
Payer: COMMERCIAL

## 2024-10-14 VITALS
OXYGEN SATURATION: 95 % | SYSTOLIC BLOOD PRESSURE: 152 MMHG | DIASTOLIC BLOOD PRESSURE: 86 MMHG | WEIGHT: 220 LBS | RESPIRATION RATE: 16 BRPM | HEIGHT: 68 IN | HEART RATE: 70 BPM | BODY MASS INDEX: 33.34 KG/M2

## 2024-10-14 DIAGNOSIS — R97.20 ELEVATED PROSTATE, SPECIFIC ANTIGEN [PSA]: ICD-10-CM

## 2024-10-14 DIAGNOSIS — N43.3 HYDROCELE, UNSPECIFIED: ICD-10-CM

## 2024-10-14 PROCEDURE — 99214 OFFICE O/P EST MOD 30 MIN: CPT

## 2024-10-15 ENCOUNTER — APPOINTMENT (OUTPATIENT)
Dept: NEUROLOGY | Facility: CLINIC | Age: 59
End: 2024-10-15
Payer: COMMERCIAL

## 2024-10-15 VITALS
WEIGHT: 220 LBS | HEIGHT: 68 IN | BODY MASS INDEX: 33.34 KG/M2 | SYSTOLIC BLOOD PRESSURE: 157 MMHG | HEART RATE: 57 BPM | DIASTOLIC BLOOD PRESSURE: 90 MMHG

## 2024-10-15 DIAGNOSIS — I63.9 CEREBRAL INFARCTION, UNSPECIFIED: ICD-10-CM

## 2024-10-15 PROCEDURE — G2211 COMPLEX E/M VISIT ADD ON: CPT | Mod: NC

## 2024-10-15 PROCEDURE — 99213 OFFICE O/P EST LOW 20 MIN: CPT

## 2024-10-15 PROCEDURE — 93892 TCD EMBOLI DETECT W/O INJ: CPT

## 2024-10-15 PROCEDURE — 93880 EXTRACRANIAL BILAT STUDY: CPT

## 2024-10-15 PROCEDURE — 93886 INTRACRANIAL COMPLETE STUDY: CPT

## 2024-10-17 ENCOUNTER — NON-APPOINTMENT (OUTPATIENT)
Age: 59
End: 2024-10-17

## 2024-10-18 RX ORDER — TAMSULOSIN HYDROCHLORIDE 0.4 MG/1
0.4 CAPSULE ORAL
Qty: 90 | Refills: 3 | Status: ACTIVE | COMMUNITY
Start: 2024-10-18 | End: 1900-01-01

## 2024-10-22 ENCOUNTER — APPOINTMENT (OUTPATIENT)
Dept: CARDIOLOGY | Facility: CLINIC | Age: 59
End: 2024-10-22
Payer: COMMERCIAL

## 2024-10-22 PROCEDURE — 93306 TTE W/DOPPLER COMPLETE: CPT

## 2024-11-19 ENCOUNTER — RX RENEWAL (OUTPATIENT)
Age: 59
End: 2024-11-19

## 2024-12-09 RX ORDER — CARVEDILOL 25 MG/1
25 TABLET, FILM COATED ORAL
Qty: 180 | Refills: 3 | Status: ACTIVE | COMMUNITY
Start: 2024-12-09

## 2024-12-21 ENCOUNTER — RX RENEWAL (OUTPATIENT)
Age: 59
End: 2024-12-21

## 2025-01-03 DIAGNOSIS — R97.20 ELEVATED PROSTATE, SPECIFIC ANTIGEN [PSA]: ICD-10-CM

## 2025-01-06 ENCOUNTER — NON-APPOINTMENT (OUTPATIENT)
Age: 60
End: 2025-01-06

## 2025-01-06 ENCOUNTER — APPOINTMENT (OUTPATIENT)
Dept: OTOLARYNGOLOGY | Facility: CLINIC | Age: 60
End: 2025-01-06
Payer: COMMERCIAL

## 2025-01-06 VITALS — HEART RATE: 63 BPM | DIASTOLIC BLOOD PRESSURE: 85 MMHG | SYSTOLIC BLOOD PRESSURE: 146 MMHG

## 2025-01-06 DIAGNOSIS — J31.0 CHRONIC RHINITIS: ICD-10-CM

## 2025-01-06 DIAGNOSIS — Z86.79 PERSONAL HISTORY OF OTHER DISEASES OF THE CIRCULATORY SYSTEM: ICD-10-CM

## 2025-01-06 DIAGNOSIS — K13.70 UNSPECIFIED LESIONS OF ORAL MUCOSA: ICD-10-CM

## 2025-01-06 DIAGNOSIS — G47.33 OBSTRUCTIVE SLEEP APNEA (ADULT) (PEDIATRIC): ICD-10-CM

## 2025-01-06 DIAGNOSIS — J34.2 DEVIATED NASAL SEPTUM: ICD-10-CM

## 2025-01-06 DIAGNOSIS — K13.79 OTHER LESIONS OF ORAL MUCOSA: ICD-10-CM

## 2025-01-06 PROCEDURE — 99203 OFFICE O/P NEW LOW 30 MIN: CPT

## 2025-01-16 ENCOUNTER — NON-APPOINTMENT (OUTPATIENT)
Age: 60
End: 2025-01-16

## 2025-01-22 ENCOUNTER — APPOINTMENT (OUTPATIENT)
Dept: UROLOGY | Facility: CLINIC | Age: 60
End: 2025-01-22
Payer: COMMERCIAL

## 2025-01-22 DIAGNOSIS — N43.3 HYDROCELE, UNSPECIFIED: ICD-10-CM

## 2025-01-22 DIAGNOSIS — R97.20 ELEVATED PROSTATE, SPECIFIC ANTIGEN [PSA]: ICD-10-CM

## 2025-01-22 PROCEDURE — 99213 OFFICE O/P EST LOW 20 MIN: CPT

## 2025-01-27 NOTE — END OF VISIT
normal mood with appropriate affect , speech clear
[Time Spent: ___ minutes] : I have spent [unfilled] minutes of time on the encounter.

## 2025-02-06 ENCOUNTER — OUTPATIENT (OUTPATIENT)
Dept: OUTPATIENT SERVICES | Facility: HOSPITAL | Age: 60
LOS: 1 days | End: 2025-02-06

## 2025-02-06 VITALS
DIASTOLIC BLOOD PRESSURE: 77 MMHG | SYSTOLIC BLOOD PRESSURE: 136 MMHG | HEIGHT: 67 IN | TEMPERATURE: 98 F | OXYGEN SATURATION: 96 % | WEIGHT: 223.99 LBS | RESPIRATION RATE: 16 BRPM | HEART RATE: 56 BPM

## 2025-02-06 DIAGNOSIS — Z96.642 PRESENCE OF LEFT ARTIFICIAL HIP JOINT: Chronic | ICD-10-CM

## 2025-02-06 DIAGNOSIS — I10 ESSENTIAL (PRIMARY) HYPERTENSION: ICD-10-CM

## 2025-02-06 DIAGNOSIS — Z98.890 OTHER SPECIFIED POSTPROCEDURAL STATES: Chronic | ICD-10-CM

## 2025-02-06 DIAGNOSIS — I63.9 CEREBRAL INFARCTION, UNSPECIFIED: ICD-10-CM

## 2025-02-06 DIAGNOSIS — N43.3 HYDROCELE, UNSPECIFIED: ICD-10-CM

## 2025-02-06 DIAGNOSIS — G47.33 OBSTRUCTIVE SLEEP APNEA (ADULT) (PEDIATRIC): ICD-10-CM

## 2025-02-06 NOTE — H&P PST ADULT - LAST STRESS TEST
2021-- No evidence of Ischemia 2021--  An exercise stress test was suboptimal, while he was on beta-blockers, and only got his heart rate up to 62% of max predicted at over 9 minutes of exercise

## 2025-02-06 NOTE — H&P PST ADULT - HISTORY OF PRESENT ILLNESS
57 y/o male with PMH of JUAN PABLO, HTN and CVA (2011, no neuro deficits) c/o painless left testicular swelling. He states that swelling started a few months ago. Patient is scheduled for hydrocelectomy unilateral  57 y/o male with PMH of JUAN PABLO, HTN, HLD and CVA (2011, no neuro deficits) c/o painless left testicular swelling. He states that swelling started a few months ago. Patient is scheduled for hydrocelectomy unilateral  55 y/o male with PMH of JUAN PABLO, HTN, HLD, Thoracic aortic aneurysm measuring 4.1 cm, and CVA (2011, no neuro deficits) c/o painless left testicular swelling. He states that swelling started a few months ago. Patient is scheduled for hydrocelectomy unilateral

## 2025-02-06 NOTE — H&P PST ADULT - NSICDXPASTMEDICALHX_GEN_ALL_CORE_FT
PAST MEDICAL HISTORY:  DJD (degenerative joint disease)     Essential hypertension     GERD (gastroesophageal reflux disease)     H/O squamous cell carcinoma excision (Right ring finger)    History of pancreatitis 2012 due to taking HCTZ    History of stroke 2011, no neuro deficits    Hyperlipidemia     Lumbar radiculopathy     Occlusion of carotid artery     JUAN PABLO on CPAP     Osteoarthritis     Seasonal allergies     Spinal stenosis     Thoracic aortic aneurysm     Thyroid nodule     Unilateral inguinal hernia, without obstruction or gangrene, not specified as recurrent

## 2025-02-06 NOTE — H&P PST ADULT - PROBLEM SELECTOR PLAN 2
Patient instructed to take losartan with a sip of water on the morning of procedure. Patient instructed to take losartan, hydralazine and Carvedilol  with a sip of water on the morning of procedure.

## 2025-02-06 NOTE — H&P PST ADULT - CARDIOVASCULAR COMMENTS
Thoracic aortic aneurysm at 4.1 cm HTN, HLD Thoracic aortic aneurysm at 4.1 cm, Cardiomyopathy as per all scripts- Patient denies , ECHO in All scripts

## 2025-02-06 NOTE — H&P PST ADULT - PROBLEM SELECTOR PLAN 1
Patient tentatively scheduled for hydrocelectomy unilateral     Pre-op instructions provided.  Famotidine provided with instructions. Hibiclens provided with instructions and was signed by patient. Teach-back method was utilized to assess patient's understanding. Patient verbalized understanding. Patient tentatively scheduled for hydrocelectomy unilateral     Pre-op instructions provided.  Famotidine provided with instructions. Teach-back method was utilized to assess patient's understanding. Patient verbalized understanding.

## 2025-02-06 NOTE — H&P PST ADULT - ATTENDING COMMENTS
pt denies changes   nad ncat    left hydrocele  pt would like to proceed with hydrocelectomy all questions answered

## 2025-02-06 NOTE — H&P PST ADULT - FUNCTIONAL STATUS
DASI score 9.89 does [employer]/4-10 METS DASI score 9.89 goes [employer] x 3/week/4-10 METS

## 2025-02-06 NOTE — H&P PST ADULT - LAST ECHOCARDIOGRAM
Echocardiogram with normal left ventricular systolic function, and mildly dilated proximal ascending aorta at 4.1 cm, along with diastolic dysfunction. October 2024-- Echocardiogram with normal left ventricular systolic function, Ef 67%, Normal left ventricular diastolic function,  Normal right ventricular cavity size and normal right ventricular systolic function. Trace MR and mildly dilated proximal ascending aorta at 4.0cm,

## 2025-02-06 NOTE — H&P PST ADULT - PRO INTERPRETER NEED 2
Impression: Long term (current) use of insulin Plan: see above diabetic note       A1c TOO HIGH in 11s in '21.   CHECK A1c always English

## 2025-02-13 ENCOUNTER — TRANSCRIPTION ENCOUNTER (OUTPATIENT)
Age: 60
End: 2025-02-13

## 2025-02-13 ENCOUNTER — NON-APPOINTMENT (OUTPATIENT)
Age: 60
End: 2025-02-13

## 2025-02-13 ENCOUNTER — OUTPATIENT (OUTPATIENT)
Dept: OUTPATIENT SERVICES | Facility: HOSPITAL | Age: 60
LOS: 1 days | Discharge: HOME | End: 2025-02-13
Payer: COMMERCIAL

## 2025-02-13 VITALS
OXYGEN SATURATION: 98 % | HEIGHT: 67 IN | SYSTOLIC BLOOD PRESSURE: 140 MMHG | WEIGHT: 223.99 LBS | HEART RATE: 53 BPM | TEMPERATURE: 98 F | RESPIRATION RATE: 17 BRPM | DIASTOLIC BLOOD PRESSURE: 94 MMHG

## 2025-02-13 VITALS
SYSTOLIC BLOOD PRESSURE: 135 MMHG | OXYGEN SATURATION: 97 % | DIASTOLIC BLOOD PRESSURE: 84 MMHG | HEART RATE: 57 BPM | TEMPERATURE: 97 F | RESPIRATION RATE: 16 BRPM

## 2025-02-13 DIAGNOSIS — Z96.642 PRESENCE OF LEFT ARTIFICIAL HIP JOINT: Chronic | ICD-10-CM

## 2025-02-13 DIAGNOSIS — N43.3 HYDROCELE, UNSPECIFIED: ICD-10-CM

## 2025-02-13 DIAGNOSIS — Z98.890 OTHER SPECIFIED POSTPROCEDURAL STATES: Chronic | ICD-10-CM

## 2025-02-13 PROCEDURE — 55060 REPAIR OF HYDROCELE: CPT | Mod: AS,LT

## 2025-02-13 PROCEDURE — 55060 REPAIR OF HYDROCELE: CPT | Mod: LT

## 2025-02-13 RX ORDER — TRAMADOL HYDROCHLORIDE 100 MG/1
1 TABLET, EXTENDED RELEASE ORAL
Qty: 16 | Refills: 0
Start: 2025-02-13 | End: 2025-02-16

## 2025-02-13 RX ORDER — CARVEDILOL 6.25 MG
1 TABLET ORAL
Refills: 0 | DISCHARGE

## 2025-02-13 RX ORDER — DOCUSATE SODIUM 100 MG
1 CAPSULE ORAL
Qty: 20 | Refills: 0
Start: 2025-02-13 | End: 2025-02-22

## 2025-02-13 RX ORDER — TAMSULOSIN HYDROCHLORIDE 0.4 MG/1
1 CAPSULE ORAL
Refills: 0 | DISCHARGE

## 2025-02-13 RX ORDER — HYDRALAZINE HCL 100 MG
1 TABLET ORAL
Refills: 0 | DISCHARGE

## 2025-02-13 NOTE — ASU DISCHARGE PLAN (ADULT/PEDIATRIC) - CARE PROVIDER_API CALL
Ray Madison  Urology  [address] [place] [address], [facility] [address]  [place] [address]  Phone: [phone]  Fax: [phone]  Follow Up Time: 2 weeks

## 2025-02-13 NOTE — ASU PREOPERATIVE ASSESSMENT, ADULT ([facility]ARK ONLY) - FALL HARM RISK - UNIVERSAL INTERVENTIONS
Bed in lowest position, wheels locked, appropriate side rails in place/Call bell, personal items and telephone in reach/Instruct patient to call for assistance before getting out of bed or chair/Non-slip footwear when patient is out of bed/[place] to call system/Physically safe environment - no spills, clutter or unnecessary equipment/Purposeful Proactive Rounding/Room/bathroom lighting operational, light cord in reach

## 2025-02-13 NOTE — ASU DISCHARGE PLAN (ADULT/PEDIATRIC) - FINANCIAL ASSISTANCE
[facility] provides services at a reduced cost to those who are determined to be eligible through [facility]’s financial assistance program. Information regarding [facility]’s financial assistance program can be found by going to [email or web address] or by calling [phone].

## 2025-02-14 PROBLEM — M54.16 RADICULOPATHY, LUMBAR REGION: Chronic | Status: ACTIVE | Noted: 2025-02-06

## 2025-02-14 PROBLEM — M19.90 UNSPECIFIED OSTEOARTHRITIS, UNSPECIFIED SITE: Chronic | Status: ACTIVE | Noted: 2025-02-06

## 2025-02-14 PROBLEM — E04.1 NONTOXIC SINGLE THYROID NODULE: Chronic | Status: ACTIVE | Noted: 2025-02-06

## 2025-02-14 PROBLEM — M48.00 SPINAL STENOSIS, SITE UNSPECIFIED: Chronic | Status: ACTIVE | Noted: 2025-02-06

## 2025-02-14 PROBLEM — I71.20 THORACIC AORTIC ANEURYSM, WITHOUT RUPTURE, UNSPECIFIED: Chronic | Status: ACTIVE | Noted: 2025-02-06

## 2025-02-14 PROBLEM — I65.29 OCCLUSION AND STENOSIS OF UNSPECIFIED CAROTID ARTERY: Chronic | Status: ACTIVE | Noted: 2025-02-06

## 2025-02-14 PROBLEM — K21.9 GASTRO-ESOPHAGEAL REFLUX DISEASE WITHOUT ESOPHAGITIS: Chronic | Status: ACTIVE | Noted: 2025-02-06

## 2025-02-14 RX ORDER — DOCUSATE SODIUM 100 MG/1
100 CAPSULE ORAL TWICE DAILY
Qty: 15 | Refills: 1 | Status: ACTIVE | COMMUNITY
Start: 2025-02-14 | End: 1900-01-01

## 2025-02-26 PROBLEM — M19.90 UNSPECIFIED OSTEOARTHRITIS, UNSPECIFIED SITE: Chronic | Status: ACTIVE | Noted: 2025-02-06

## 2025-02-28 ENCOUNTER — APPOINTMENT (OUTPATIENT)
Dept: UROLOGY | Facility: CLINIC | Age: 60
End: 2025-02-28
Payer: COMMERCIAL

## 2025-02-28 DIAGNOSIS — N43.3 HYDROCELE, UNSPECIFIED: ICD-10-CM

## 2025-02-28 LAB
MEV IGG FLD QL IA: 81 AU/ML
MEV IGG+IGM SER-IMP: POSITIVE
MUV AB SER-ACNC: POSITIVE
MUV IGG SER QL IA: 114 AU/ML
RUBV IGG FLD-ACNC: 1.73 INDEX
RUBV IGG SER-IMP: POSITIVE
VZV AB TITR SER: POSITIVE
VZV IGG SER IF-ACNC: 39.8 S/CO

## 2025-02-28 PROCEDURE — 99024 POSTOP FOLLOW-UP VISIT: CPT

## 2025-04-07 ENCOUNTER — APPOINTMENT (OUTPATIENT)
Dept: OTOLARYNGOLOGY | Facility: CLINIC | Age: 60
End: 2025-04-07
Payer: COMMERCIAL

## 2025-04-07 DIAGNOSIS — J34.2 DEVIATED NASAL SEPTUM: ICD-10-CM

## 2025-04-07 DIAGNOSIS — K13.70 UNSPECIFIED LESIONS OF ORAL MUCOSA: ICD-10-CM

## 2025-04-07 DIAGNOSIS — G47.33 OBSTRUCTIVE SLEEP APNEA (ADULT) (PEDIATRIC): ICD-10-CM

## 2025-04-07 DIAGNOSIS — J31.0 CHRONIC RHINITIS: ICD-10-CM

## 2025-04-07 DIAGNOSIS — J35.8 OTHER CHRONIC DISEASES OF TONSILS AND ADENOIDS: ICD-10-CM

## 2025-04-07 DIAGNOSIS — J39.2 OTHER DISEASES OF PHARYNX: ICD-10-CM

## 2025-04-07 PROCEDURE — 99214 OFFICE O/P EST MOD 30 MIN: CPT

## 2025-04-11 ENCOUNTER — RESULT REVIEW (OUTPATIENT)
Age: 60
End: 2025-04-11

## 2025-04-11 ENCOUNTER — APPOINTMENT (OUTPATIENT)
Dept: CT IMAGING | Facility: CLINIC | Age: 60
End: 2025-04-11

## 2025-04-11 ENCOUNTER — OUTPATIENT (OUTPATIENT)
Dept: OUTPATIENT SERVICES | Facility: HOSPITAL | Age: 60
LOS: 1 days | End: 2025-04-11
Payer: COMMERCIAL

## 2025-04-11 ENCOUNTER — RX RENEWAL (OUTPATIENT)
Age: 60
End: 2025-04-11

## 2025-04-11 DIAGNOSIS — J39.2 OTHER DISEASES OF PHARYNX: ICD-10-CM

## 2025-04-11 DIAGNOSIS — Z98.890 OTHER SPECIFIED POSTPROCEDURAL STATES: Chronic | ICD-10-CM

## 2025-04-11 DIAGNOSIS — Z96.642 PRESENCE OF LEFT ARTIFICIAL HIP JOINT: Chronic | ICD-10-CM

## 2025-04-11 DIAGNOSIS — J35.8 OTHER CHRONIC DISEASES OF TONSILS AND ADENOIDS: ICD-10-CM

## 2025-04-11 PROCEDURE — 70491 CT SOFT TISSUE NECK W/DYE: CPT

## 2025-04-11 PROCEDURE — 70491 CT SOFT TISSUE NECK W/DYE: CPT | Mod: 26

## 2025-04-29 ENCOUNTER — APPOINTMENT (OUTPATIENT)
Dept: OTOLARYNGOLOGY | Facility: CLINIC | Age: 60
End: 2025-04-29
Payer: COMMERCIAL

## 2025-04-29 VITALS
BODY MASS INDEX: 34.1 KG/M2 | WEIGHT: 225 LBS | SYSTOLIC BLOOD PRESSURE: 160 MMHG | HEIGHT: 68 IN | DIASTOLIC BLOOD PRESSURE: 80 MMHG | HEART RATE: 60 BPM

## 2025-04-29 DIAGNOSIS — G47.33 OBSTRUCTIVE SLEEP APNEA (ADULT) (PEDIATRIC): ICD-10-CM

## 2025-04-29 DIAGNOSIS — H10.13 ACUTE ATOPIC CONJUNCTIVITIS, BILATERAL: ICD-10-CM

## 2025-04-29 DIAGNOSIS — K13.70 UNSPECIFIED LESIONS OF ORAL MUCOSA: ICD-10-CM

## 2025-04-29 DIAGNOSIS — K13.79 OTHER LESIONS OF ORAL MUCOSA: ICD-10-CM

## 2025-04-29 DIAGNOSIS — J31.0 CHRONIC RHINITIS: ICD-10-CM

## 2025-04-29 DIAGNOSIS — J34.2 DEVIATED NASAL SEPTUM: ICD-10-CM

## 2025-04-29 DIAGNOSIS — J35.8 OTHER CHRONIC DISEASES OF TONSILS AND ADENOIDS: ICD-10-CM

## 2025-04-29 PROCEDURE — 99214 OFFICE O/P EST MOD 30 MIN: CPT

## 2025-04-29 RX ORDER — FLUTICASONE PROPIONATE 50 UG/1
50 SPRAY, METERED NASAL
Qty: 1 | Refills: 3 | Status: ACTIVE | COMMUNITY
Start: 2025-04-29 | End: 1900-01-01

## 2025-04-29 RX ORDER — AZELASTINE HYDROCHLORIDE 0.5 MG/ML
0.05 SOLUTION/ DROPS OPHTHALMIC
Qty: 1 | Refills: 2 | Status: ACTIVE | COMMUNITY
Start: 2025-04-29 | End: 1900-01-01

## 2025-06-16 ENCOUNTER — RX RENEWAL (OUTPATIENT)
Age: 60
End: 2025-06-16

## 2025-07-18 ENCOUNTER — APPOINTMENT (OUTPATIENT)
Dept: UROLOGY | Facility: CLINIC | Age: 60
End: 2025-07-18
Payer: COMMERCIAL

## 2025-07-18 PROCEDURE — 99213 OFFICE O/P EST LOW 20 MIN: CPT

## 2025-07-29 ENCOUNTER — APPOINTMENT (OUTPATIENT)
Dept: OTOLARYNGOLOGY | Facility: CLINIC | Age: 60
End: 2025-07-29
Payer: COMMERCIAL

## 2025-07-29 VITALS
SYSTOLIC BLOOD PRESSURE: 151 MMHG | HEART RATE: 56 BPM | HEIGHT: 68 IN | BODY MASS INDEX: 34.1 KG/M2 | DIASTOLIC BLOOD PRESSURE: 76 MMHG | WEIGHT: 225 LBS

## 2025-07-29 DIAGNOSIS — K13.70 UNSPECIFIED LESIONS OF ORAL MUCOSA: ICD-10-CM

## 2025-07-29 DIAGNOSIS — J34.2 DEVIATED NASAL SEPTUM: ICD-10-CM

## 2025-07-29 DIAGNOSIS — J31.0 CHRONIC RHINITIS: ICD-10-CM

## 2025-07-29 DIAGNOSIS — G47.33 OBSTRUCTIVE SLEEP APNEA (ADULT) (PEDIATRIC): ICD-10-CM

## 2025-07-29 PROCEDURE — 99213 OFFICE O/P EST LOW 20 MIN: CPT

## (undated) DEVICE — ELCTR BOVIE TIP NEEDLE INSULATED 6.5" EDGE

## (undated) DEVICE — D HELP - CLEARVIEW CLEARIFY SYSTEM

## (undated) DEVICE — TUBING STRYKER PNEUMOSURE HEATED RTP

## (undated) DEVICE — SOL IRR BAG NS 0.9% 1000ML

## (undated) DEVICE — POSITIONER FOAM EGG CRATE ULNAR 2PCS (PINK)

## (undated) DEVICE — XI SEAL UNIV 5- 8 MM

## (undated) DEVICE — POSITIONER PATIENT SAFETY STRAP 3X60"

## (undated) DEVICE — ELCTR BOVIE TIP BLADE INSULATED 2.75" EDGE

## (undated) DEVICE — SOL IRR POUR NS 0.9% 1000ML

## (undated) DEVICE — ELCTR ROCKER SWITCH PENCIL BLUE 10FT

## (undated) DEVICE — DRSG GAUZE VASELINE PETROLEUM 1 X 8" CISION

## (undated) DEVICE — Device

## (undated) DEVICE — XI TIP COVER

## (undated) DEVICE — VENODYNE/SCD SLEEVE CALF MEDIUM

## (undated) DEVICE — ELCTR GROUNDING PAD ADULT COVIDIEN

## (undated) DEVICE — VENODYNE/SCD SLEEVE CALF LARGE

## (undated) DEVICE — XI DRAPE COLUMN

## (undated) DEVICE — GLV 7.5 PROTEXIS (WHITE)

## (undated) DEVICE — GOWN LG

## (undated) DEVICE — ELCTR BOVIE PENCIL SMOKE EVACUATION

## (undated) DEVICE — SUT MONOCRYL 4-0 27" PS-2 UNDYED

## (undated) DEVICE — DRSG STERISTRIPS 0.5 X 4"

## (undated) DEVICE — FOLEY TRAY 16FR LF URINE METER SURESTEP

## (undated) DEVICE — DRAPE 3/4 SHEET W REINFORCEMENT 56X77"

## (undated) DEVICE — DRAPE LAPAROTOMY TRANSVERSE

## (undated) DEVICE — PLV/PSP-SUCTION IRRIGATOR STRYKER: Type: DURABLE MEDICAL EQUIPMENT

## (undated) DEVICE — PACK GENERAL LAPAROSCOPY

## (undated) DEVICE — SUT MAXON 3-0 30" V-20

## (undated) DEVICE — PLV-SCD MACHINE: Type: DURABLE MEDICAL EQUIPMENT

## (undated) DEVICE — XI DRAPE ARM

## (undated) DEVICE — DRAPE TOWEL BLUE 17" X 24"

## (undated) DEVICE — PREP BETADINE KIT

## (undated) DEVICE — PACK MINOR NO DRAPE

## (undated) DEVICE — WARMING BLANKET UPPER ADULT

## (undated) DEVICE — DRSG CURITY GAUZE SPONGE 4 X 4" 12-PLY

## (undated) DEVICE — SOL IRR POUR NS 0.9% 500ML

## (undated) DEVICE — SUT VICRYL 3-0 18" SH (POP-OFF)

## (undated) DEVICE — SUSPENSORY WITH LEG STRAPS XL